# Patient Record
Sex: FEMALE | Race: WHITE | Employment: OTHER | URBAN - METROPOLITAN AREA
[De-identification: names, ages, dates, MRNs, and addresses within clinical notes are randomized per-mention and may not be internally consistent; named-entity substitution may affect disease eponyms.]

---

## 2020-09-30 ENCOUNTER — HOSPITAL ENCOUNTER (OUTPATIENT)
Dept: MAMMOGRAPHY | Age: 75
Discharge: HOME | End: 2020-09-30
Attending: INTERNAL MEDICINE
Payer: MEDICARE

## 2020-09-30 DIAGNOSIS — Z12.31 VISIT FOR SCREENING MAMMOGRAM: ICD-10-CM

## 2020-09-30 PROCEDURE — 77067 SCR MAMMO BI INCL CAD: CPT

## 2022-01-12 ENCOUNTER — TRANSCRIBE ORDER (OUTPATIENT)
Dept: SCHEDULING | Age: 77
End: 2022-01-12

## 2022-01-12 DIAGNOSIS — Z12.31 VISIT FOR SCREENING MAMMOGRAM: Primary | ICD-10-CM

## 2022-01-28 ENCOUNTER — TRANSCRIBE ORDER (OUTPATIENT)
Dept: REGISTRATION | Age: 77
End: 2022-01-28

## 2022-01-28 DIAGNOSIS — Z12.31 SCREENING MAMMOGRAM FOR HIGH-RISK PATIENT: Primary | ICD-10-CM

## 2022-02-26 ENCOUNTER — HOSPITAL ENCOUNTER (OUTPATIENT)
Dept: MAMMOGRAPHY | Age: 77
Discharge: HOME | End: 2022-02-26
Attending: INTERNAL MEDICINE
Payer: MEDICARE

## 2022-02-26 DIAGNOSIS — Z12.31 SCREENING MAMMOGRAM FOR HIGH-RISK PATIENT: ICD-10-CM

## 2022-02-26 PROCEDURE — 77063 BREAST TOMOSYNTHESIS BI: CPT

## 2022-03-03 ENCOUNTER — HOSPITAL ENCOUNTER (OUTPATIENT)
Dept: MAMMOGRAPHY | Age: 77
Discharge: HOME | End: 2022-03-03
Attending: INTERNAL MEDICINE
Payer: MEDICARE

## 2022-03-03 DIAGNOSIS — R92.8 ABNORMAL SCREENING MAMMOGRAM: ICD-10-CM

## 2022-03-03 PROCEDURE — 76642 ULTRASOUND BREAST LIMITED: CPT

## 2022-03-07 ENCOUNTER — HOSPITAL ENCOUNTER (OUTPATIENT)
Dept: MAMMOGRAPHY | Age: 77
Discharge: HOME | End: 2022-03-07
Attending: INTERNAL MEDICINE
Payer: MEDICARE

## 2022-03-07 VITALS — SYSTOLIC BLOOD PRESSURE: 125 MMHG | DIASTOLIC BLOOD PRESSURE: 63 MMHG | HEART RATE: 71 BPM

## 2022-03-07 DIAGNOSIS — Z12.31 VISIT FOR SCREENING MAMMOGRAM: ICD-10-CM

## 2022-03-07 DIAGNOSIS — R92.8 ABNORMAL MAMMOGRAM OF LEFT BREAST: ICD-10-CM

## 2022-03-07 PROCEDURE — 74011000250 HC RX REV CODE- 250: Performed by: INTERNAL MEDICINE

## 2022-03-07 PROCEDURE — 88305 TISSUE EXAM BY PATHOLOGIST: CPT

## 2022-03-07 PROCEDURE — 19083 BX BREAST 1ST LESION US IMAG: CPT

## 2022-03-07 PROCEDURE — 88361 TUMOR IMMUNOHISTOCHEM/COMPUT: CPT

## 2022-03-07 PROCEDURE — 77065 DX MAMMO INCL CAD UNI: CPT

## 2022-03-07 RX ORDER — LIDOCAINE HYDROCHLORIDE 10 MG/ML
10 INJECTION INFILTRATION; PERINEURAL
Status: COMPLETED | OUTPATIENT
Start: 2022-03-07 | End: 2022-03-07

## 2022-03-07 RX ORDER — LIDOCAINE HYDROCHLORIDE 10 MG/ML
4 INJECTION INFILTRATION; PERINEURAL
Status: DISPENSED | OUTPATIENT
Start: 2022-03-07 | End: 2022-03-07

## 2022-03-07 RX ADMIN — LIDOCAINE HYDROCHLORIDE 10 ML: 10 INJECTION, SOLUTION INFILTRATION; PERINEURAL at 09:25

## 2022-03-09 NOTE — PROGRESS NOTES
Dr Carla Johnson and I spoke with Ms Cipriano Calero and her sister regarding the results of her Lt breast biopsy. Pathology: IDC w/ lobular features. She had no post bx issues or concerns. She was a bit taken back with the results, she was not expecting a positive result. Her sister had DCIS 5 yrs ago and is a good support person.  She has the following upcoming appointments:  Dr Luis Daniel Cancino   3-11 @ 9:00  Dr Marcos Sandoval  TBD waiting on appt date & time  I gave them an information packet, prayed with them, answered their questions and told her I would call her with Dr Marcos Sandoval appt

## 2022-03-11 ENCOUNTER — PATIENT OUTREACH (OUTPATIENT)
Dept: CASE MANAGEMENT | Age: 77
End: 2022-03-11

## 2022-03-11 NOTE — PROGRESS NOTES
3/11/2022  Patient seen with Dr. Kaushik Sandoval for New Patient Breast Cancer visit. Here today with her sister Alejandra Wiseman, her primary support person. Patient is retired and lives alone. States from large family of 12 siblings. Independent in self care with no physical limitations. States has bad knees but does not require assistance to ambulate  Barriers:  No barriers identified. History:  Breast Cancer in sister age 67, DCIS, sister with Lymphoma 76  Type of cancer: Right breast infiltrating ductal carcinoma, ERPR+ HER2-  MRI none recommended.   Plan:  Surgery first then potentially chemo depending on Oncotype DX results, AI, and potentially radiation  Surgeon: Naima Amin 4/5/2022  PCP:  Mikael Salgado MD  Medical history:  Hyperlipidemia, Hypothyroidism, Jonathan Ugo, Glaucoma, [address]  Genetics Referral - not applicable, does not meet guidlines  Added MD and Navigator to treatment team   Written ASCO Breast information given  Social Determinants of health complete  Depression screen =0, well managed with Celexa

## 2022-03-14 ENCOUNTER — NURSE NAVIGATOR (OUTPATIENT)
Dept: CASE MANAGEMENT | Age: 77
End: 2022-03-14

## 2022-03-14 NOTE — PROGRESS NOTES
Call to patient, discussed per Genetics did not meet NCCN guidelines for genetic testing. Discussed with patient that she is always welcome to have consult with Genetics counselor and that self pay rate for testing is currently around $250.00. Leeanne Lopez Patient states she is not interested at this time in testing.

## 2022-03-23 PROBLEM — Z17.0 MALIGNANT NEOPLASM OF LEFT BREAST IN FEMALE, ESTROGEN RECEPTOR POSITIVE (HCC): Status: ACTIVE | Noted: 2022-03-23

## 2022-03-23 PROBLEM — C50.912 MALIGNANT NEOPLASM OF LEFT BREAST IN FEMALE, ESTROGEN RECEPTOR POSITIVE (HCC): Status: ACTIVE | Noted: 2022-03-23

## 2022-03-24 PROBLEM — Z17.0 MALIGNANT NEOPLASM OF LEFT BREAST IN FEMALE, ESTROGEN RECEPTOR POSITIVE (HCC): Status: ACTIVE | Noted: 2022-03-23

## 2022-03-24 PROBLEM — C50.912 MALIGNANT NEOPLASM OF LEFT BREAST IN FEMALE, ESTROGEN RECEPTOR POSITIVE (HCC): Status: ACTIVE | Noted: 2022-03-23

## 2022-04-06 RX ORDER — CEFAZOLIN SODIUM 1 G/3ML
2-3 INJECTION, POWDER, FOR SOLUTION INTRAMUSCULAR; INTRAVENOUS
Status: CANCELLED | OUTPATIENT
Start: 2022-04-06 | End: 2022-04-06

## 2022-04-21 VITALS — HEIGHT: 67 IN | WEIGHT: 225 LBS | BODY MASS INDEX: 35.31 KG/M2

## 2022-04-21 NOTE — PERIOP NOTES
Patient verified name and . Order for consent is found in EHR and matches case posting; patient verifies procedure. Type 1b surgery, Phone assessment complete. Orders were received. Labs per surgeon: none  Labs per anesthesia protocol: none    Patient answered medical/surgical history questions at their best of ability. All prior to admission medications documented in [facility]. Patient instructed to take the following medications the day of surgery according to anesthesia guidelines with a small sip of water: Celexa, synthroid. Hold all vitamins 7 days prior to surgery and NSAIDS 5 days prior to surgery. Prescription meds to hold:Celebrex and vitamins. Patient instructed on the following:    > Arrive at [facility], time of arrival to be called the day before by 1700  > NPO after midnight including gum, mints, and ice chips  > Responsible adult must drive patient to the hospital, stay during surgery, and patient will need supervision 24 hours after anesthesia  > Use hibiclens in shower the night before surgery and on the morning of surgery  > All piercings must be removed prior to arrival.    > Leave all valuables (money and jewelry) at home but bring insurance card and ID on DOS.   > Do not wear make-up, nail polish, lotions, cologne, perfumes, powders, or oil on skin. Artificial nails are not permitted.

## 2022-04-26 ENCOUNTER — HOSPITAL ENCOUNTER (OUTPATIENT)
Dept: SURGERY | Age: 77
Discharge: HOME | End: 2022-04-26

## 2022-05-02 ENCOUNTER — ANESTHESIA EVENT (OUTPATIENT)
Dept: SURGERY | Age: 77
End: 2022-05-02
Payer: MEDICARE

## 2022-05-03 ENCOUNTER — ANESTHESIA (OUTPATIENT)
Dept: SURGERY | Age: 77
End: 2022-05-03
Payer: MEDICARE

## 2022-05-03 ENCOUNTER — APPOINTMENT (OUTPATIENT)
Dept: MAMMOGRAPHY | Age: 77
End: 2022-05-03
Attending: SURGERY
Payer: MEDICARE

## 2022-05-03 ENCOUNTER — HOSPITAL ENCOUNTER (OUTPATIENT)
Age: 77
Setting detail: OUTPATIENT SURGERY
Discharge: HOME | End: 2022-05-03
Attending: SURGERY | Admitting: SURGERY
Payer: MEDICARE

## 2022-05-03 ENCOUNTER — APPOINTMENT (OUTPATIENT)
Dept: NUCLEAR MEDICINE | Age: 77
End: 2022-05-03
Attending: SURGERY
Payer: MEDICARE

## 2022-05-03 VITALS
TEMPERATURE: 97.8 F | HEART RATE: 89 BPM | BODY MASS INDEX: 34.37 KG/M2 | RESPIRATION RATE: 16 BRPM | SYSTOLIC BLOOD PRESSURE: 132 MMHG | DIASTOLIC BLOOD PRESSURE: 69 MMHG | HEIGHT: 67 IN | WEIGHT: 219 LBS | OXYGEN SATURATION: 95 %

## 2022-05-03 DIAGNOSIS — C50.912 DUCTAL CARCINOMA OF BREAST, LEFT (HCC): ICD-10-CM

## 2022-05-03 DIAGNOSIS — Z80.3 FAMILY HISTORY OF BREAST CANCER IN SISTER: ICD-10-CM

## 2022-05-03 PROCEDURE — 74011250636 HC RX REV CODE- 250/636: Performed by: SURGERY

## 2022-05-03 PROCEDURE — 76210000006 HC OR PH I REC 0.5 TO 1 HR: Performed by: SURGERY

## 2022-05-03 PROCEDURE — 74011000250 HC RX REV CODE- 250: Performed by: ANESTHESIOLOGY

## 2022-05-03 PROCEDURE — 38900 IO MAP OF SENT LYMPH NODE: CPT | Performed by: SURGERY

## 2022-05-03 PROCEDURE — 74011250636 HC RX REV CODE- 250/636: Performed by: ANESTHESIOLOGY

## 2022-05-03 PROCEDURE — 2709999900 HC NON-CHARGEABLE SUPPLY: Performed by: SURGERY

## 2022-05-03 PROCEDURE — 76210000020 HC REC RM PH II FIRST 0.5 HR: Performed by: SURGERY

## 2022-05-03 PROCEDURE — 77030031139 HC SUT VCRL2 J&J -A: Performed by: SURGERY

## 2022-05-03 PROCEDURE — 77030040361 HC SLV COMPR DVT MDII -B: Performed by: SURGERY

## 2022-05-03 PROCEDURE — 19301 PARTIAL MASTECTOMY: CPT | Performed by: SURGERY

## 2022-05-03 PROCEDURE — 74011000250 HC RX REV CODE- 250: Performed by: SURGERY

## 2022-05-03 PROCEDURE — 77065 DX MAMMO INCL CAD UNI: CPT

## 2022-05-03 PROCEDURE — 77030040922 HC BLNKT HYPOTHRM STRY -A: Performed by: ANESTHESIOLOGY

## 2022-05-03 PROCEDURE — 38525 BIOPSY/REMOVAL LYMPH NODES: CPT | Performed by: SURGERY

## 2022-05-03 PROCEDURE — 76010000162 HC OR TIME 1.5 TO 2 HR INTENSV-TIER 1: Performed by: SURGERY

## 2022-05-03 PROCEDURE — A9541 TC99M SULFUR COLLOID: HCPCS

## 2022-05-03 PROCEDURE — 76060000034 HC ANESTHESIA 1.5 TO 2 HR: Performed by: SURGERY

## 2022-05-03 PROCEDURE — C1819 TISSUE LOCALIZATION-EXCISION: HCPCS

## 2022-05-03 PROCEDURE — 77030031304 HC WAVWGD EIGR DISP INVO -D: Performed by: SURGERY

## 2022-05-03 PROCEDURE — 74011250637 HC RX REV CODE- 250/637: Performed by: ANESTHESIOLOGY

## 2022-05-03 PROCEDURE — 88307 TISSUE EXAM BY PATHOLOGIST: CPT

## 2022-05-03 PROCEDURE — 77030002996 HC SUT SLK J&J -A: Performed by: SURGERY

## 2022-05-03 PROCEDURE — 77030010512 HC APPL CLP LIG J&J -C: Performed by: SURGERY

## 2022-05-03 PROCEDURE — 77030016441 HC APPL CLP LIG1 J&J -B: Performed by: SURGERY

## 2022-05-03 PROCEDURE — 77030037088 HC TUBE ENDOTRACH ORAL NSL COVD-A: Performed by: ANESTHESIOLOGY

## 2022-05-03 PROCEDURE — 77030039425 HC BLD LARYNG TRULITE DISP TELE -A: Performed by: ANESTHESIOLOGY

## 2022-05-03 RX ORDER — SODIUM CHLORIDE, SODIUM LACTATE, POTASSIUM CHLORIDE, CALCIUM CHLORIDE 600; 310; 30; 20 MG/100ML; MG/100ML; MG/100ML; MG/100ML
100 INJECTION, SOLUTION INTRAVENOUS CONTINUOUS
Status: DISCONTINUED | OUTPATIENT
Start: 2022-05-03 | End: 2022-05-03 | Stop reason: HOSPADM

## 2022-05-03 RX ORDER — FENTANYL CITRATE 50 UG/ML
INJECTION, SOLUTION INTRAMUSCULAR; INTRAVENOUS AS NEEDED
Status: DISCONTINUED | OUTPATIENT
Start: 2022-05-03 | End: 2022-05-03 | Stop reason: HOSPADM

## 2022-05-03 RX ORDER — OXYCODONE HYDROCHLORIDE 5 MG/1
5 TABLET ORAL
Status: COMPLETED | OUTPATIENT
Start: 2022-05-03 | End: 2022-05-03

## 2022-05-03 RX ORDER — DEXAMETHASONE SODIUM PHOSPHATE 4 MG/ML
INJECTION, SOLUTION INTRA-ARTICULAR; INTRALESIONAL; INTRAMUSCULAR; INTRAVENOUS; SOFT TISSUE AS NEEDED
Status: DISCONTINUED | OUTPATIENT
Start: 2022-05-03 | End: 2022-05-03 | Stop reason: HOSPADM

## 2022-05-03 RX ORDER — CEFAZOLIN SODIUM/WATER 2 G/20 ML
2 SYRINGE (ML) INTRAVENOUS ONCE
Status: COMPLETED | OUTPATIENT
Start: 2022-05-03 | End: 2022-05-03

## 2022-05-03 RX ORDER — ROCURONIUM BROMIDE 10 MG/ML
INJECTION, SOLUTION INTRAVENOUS AS NEEDED
Status: DISCONTINUED | OUTPATIENT
Start: 2022-05-03 | End: 2022-05-03 | Stop reason: HOSPADM

## 2022-05-03 RX ORDER — ONDANSETRON 2 MG/ML
INJECTION INTRAMUSCULAR; INTRAVENOUS AS NEEDED
Status: DISCONTINUED | OUTPATIENT
Start: 2022-05-03 | End: 2022-05-03 | Stop reason: HOSPADM

## 2022-05-03 RX ORDER — TRAMADOL HYDROCHLORIDE 50 MG/1
50 TABLET ORAL
Qty: 20 TABLET | Refills: 0 | Status: SHIPPED | OUTPATIENT
Start: 2022-05-03 | End: 2022-05-08

## 2022-05-03 RX ORDER — MIDAZOLAM HYDROCHLORIDE 1 MG/ML
2 INJECTION, SOLUTION INTRAMUSCULAR; INTRAVENOUS
Status: DISCONTINUED | OUTPATIENT
Start: 2022-05-03 | End: 2022-05-03 | Stop reason: HOSPADM

## 2022-05-03 RX ORDER — HALOPERIDOL 5 MG/ML
1 INJECTION INTRAMUSCULAR
Status: DISCONTINUED | OUTPATIENT
Start: 2022-05-03 | End: 2022-05-03 | Stop reason: HOSPADM

## 2022-05-03 RX ORDER — HYDROMORPHONE HYDROCHLORIDE 2 MG/ML
0.5 INJECTION, SOLUTION INTRAMUSCULAR; INTRAVENOUS; SUBCUTANEOUS
Status: DISCONTINUED | OUTPATIENT
Start: 2022-05-03 | End: 2022-05-03 | Stop reason: HOSPADM

## 2022-05-03 RX ORDER — PROPOFOL 10 MG/ML
INJECTION, EMULSION INTRAVENOUS AS NEEDED
Status: DISCONTINUED | OUTPATIENT
Start: 2022-05-03 | End: 2022-05-03 | Stop reason: HOSPADM

## 2022-05-03 RX ORDER — SODIUM CHLORIDE, SODIUM LACTATE, POTASSIUM CHLORIDE, CALCIUM CHLORIDE 600; 310; 30; 20 MG/100ML; MG/100ML; MG/100ML; MG/100ML
75 INJECTION, SOLUTION INTRAVENOUS CONTINUOUS
Status: DISCONTINUED | OUTPATIENT
Start: 2022-05-03 | End: 2022-05-03 | Stop reason: HOSPADM

## 2022-05-03 RX ORDER — GLYCOPYRROLATE 0.2 MG/ML
INJECTION INTRAMUSCULAR; INTRAVENOUS AS NEEDED
Status: DISCONTINUED | OUTPATIENT
Start: 2022-05-03 | End: 2022-05-03 | Stop reason: HOSPADM

## 2022-05-03 RX ORDER — LIDOCAINE HYDROCHLORIDE 10 MG/ML
5 INJECTION INFILTRATION; PERINEURAL
Status: COMPLETED | OUTPATIENT
Start: 2022-05-03 | End: 2022-05-03

## 2022-05-03 RX ORDER — DIPHENHYDRAMINE HYDROCHLORIDE 50 MG/ML
12.5 INJECTION, SOLUTION INTRAMUSCULAR; INTRAVENOUS
Status: DISCONTINUED | OUTPATIENT
Start: 2022-05-03 | End: 2022-05-03 | Stop reason: HOSPADM

## 2022-05-03 RX ORDER — EPHEDRINE SULFATE/0.9% NACL/PF 50 MG/5 ML
SYRINGE (ML) INTRAVENOUS AS NEEDED
Status: DISCONTINUED | OUTPATIENT
Start: 2022-05-03 | End: 2022-05-03 | Stop reason: HOSPADM

## 2022-05-03 RX ORDER — BUPIVACAINE HYDROCHLORIDE AND EPINEPHRINE 5; 5 MG/ML; UG/ML
INJECTION, SOLUTION EPIDURAL; INTRACAUDAL; PERINEURAL AS NEEDED
Status: DISCONTINUED | OUTPATIENT
Start: 2022-05-03 | End: 2022-05-03 | Stop reason: HOSPADM

## 2022-05-03 RX ORDER — LIDOCAINE HYDROCHLORIDE 10 MG/ML
0.1 INJECTION INFILTRATION; PERINEURAL AS NEEDED
Status: DISCONTINUED | OUTPATIENT
Start: 2022-05-03 | End: 2022-05-03 | Stop reason: HOSPADM

## 2022-05-03 RX ORDER — NALOXONE HYDROCHLORIDE 0.4 MG/ML
0.1 INJECTION, SOLUTION INTRAMUSCULAR; INTRAVENOUS; SUBCUTANEOUS
Status: DISCONTINUED | OUTPATIENT
Start: 2022-05-03 | End: 2022-05-03 | Stop reason: HOSPADM

## 2022-05-03 RX ORDER — NEOSTIGMINE METHYLSULFATE 1 MG/ML
INJECTION, SOLUTION INTRAVENOUS AS NEEDED
Status: DISCONTINUED | OUTPATIENT
Start: 2022-05-03 | End: 2022-05-03 | Stop reason: HOSPADM

## 2022-05-03 RX ORDER — LIDOCAINE HYDROCHLORIDE 20 MG/ML
INJECTION, SOLUTION EPIDURAL; INFILTRATION; INTRACAUDAL; PERINEURAL AS NEEDED
Status: DISCONTINUED | OUTPATIENT
Start: 2022-05-03 | End: 2022-05-03 | Stop reason: HOSPADM

## 2022-05-03 RX ORDER — FLUMAZENIL 0.1 MG/ML
0.2 INJECTION INTRAVENOUS
Status: DISCONTINUED | OUTPATIENT
Start: 2022-05-03 | End: 2022-05-03 | Stop reason: HOSPADM

## 2022-05-03 RX ADMIN — Medication 2 G: at 13:07

## 2022-05-03 RX ADMIN — SODIUM CHLORIDE, SODIUM LACTATE, POTASSIUM CHLORIDE, AND CALCIUM CHLORIDE 100 ML/HR: 600; 310; 30; 20 INJECTION, SOLUTION INTRAVENOUS at 09:08

## 2022-05-03 RX ADMIN — ROCURONIUM BROMIDE 35 MG: 50 INJECTION, SOLUTION INTRAVENOUS at 13:13

## 2022-05-03 RX ADMIN — Medication 2 MG: at 14:36

## 2022-05-03 RX ADMIN — Medication 10 MG: at 13:39

## 2022-05-03 RX ADMIN — DEXAMETHASONE SODIUM PHOSPHATE 8 MG: 4 INJECTION, SOLUTION INTRAMUSCULAR; INTRAVENOUS at 13:43

## 2022-05-03 RX ADMIN — FENTANYL CITRATE 100 MCG: 50 INJECTION INTRAMUSCULAR; INTRAVENOUS at 14:19

## 2022-05-03 RX ADMIN — FENTANYL CITRATE 50 MCG: 50 INJECTION INTRAMUSCULAR; INTRAVENOUS at 13:13

## 2022-05-03 RX ADMIN — Medication 5 MG: at 13:19

## 2022-05-03 RX ADMIN — LIDOCAINE HYDROCHLORIDE 100 MG: 20 INJECTION, SOLUTION EPIDURAL; INFILTRATION; INTRACAUDAL; PERINEURAL at 13:13

## 2022-05-03 RX ADMIN — LIDOCAINE HYDROCHLORIDE 5 ML: 10 INJECTION, SOLUTION INFILTRATION; PERINEURAL at 11:22

## 2022-05-03 RX ADMIN — ONDANSETRON 4 MG: 2 INJECTION INTRAMUSCULAR; INTRAVENOUS at 13:43

## 2022-05-03 RX ADMIN — Medication 5 MG: at 13:18

## 2022-05-03 RX ADMIN — HYDROMORPHONE HYDROCHLORIDE 0.5 MG: 2 INJECTION, SOLUTION INTRAMUSCULAR; INTRAVENOUS; SUBCUTANEOUS at 15:00

## 2022-05-03 RX ADMIN — OXYCODONE 5 MG: 5 TABLET ORAL at 15:19

## 2022-05-03 RX ADMIN — GLYCOPYRROLATE 0.4 MG: 0.2 INJECTION, SOLUTION INTRAMUSCULAR; INTRAVENOUS at 14:36

## 2022-05-03 RX ADMIN — FENTANYL CITRATE 50 MCG: 50 INJECTION INTRAMUSCULAR; INTRAVENOUS at 13:44

## 2022-05-03 RX ADMIN — LIDOCAINE HYDROCHLORIDE 0.1 ML: 10 INJECTION, SOLUTION INFILTRATION; PERINEURAL at 09:07

## 2022-05-03 RX ADMIN — PROPOFOL 150 MG: 10 INJECTION, EMULSION INTRAVENOUS at 13:13

## 2022-05-03 NOTE — ANESTHESIA PREPROCEDURE EVALUATION
Relevant Problems   PERSONAL HX & FAMILY HX OF CANCER   (+) Malignant neoplasm of left breast in female, estrogen receptor positive (HCC)       Anesthetic History   No history of anesthetic complications            Review of Systems / Medical History  Patient summary reviewed and pertinent labs reviewed    Pulmonary  Within defined limits                 Neuro/Psych   Within defined limits           Cardiovascular              Hyperlipidemia    Exercise tolerance: >4 METS     GI/Hepatic/Renal  Within defined limits              Endo/Other      Hypothyroidism: well controlled  Obesity, arthritis and cancer (Breast)     Other Findings            Physical Exam    Airway  Mallampati: II  TM Distance: 4 - 6 cm  Neck ROM: normal range of motion   Mouth opening: Normal     Cardiovascular  Regular rate and rhythm,  S1 and S2 normal,  no murmur, click, rub, or gallop             Dental    Dentition: Lower partial plate     Pulmonary  Breath sounds clear to auscultation               Abdominal  GI exam deferred       Other Findings            Anesthetic Plan    ASA: 2  Anesthesia type: general          Induction: Intravenous  Anesthetic plan and risks discussed with: Patient

## 2022-05-03 NOTE — ANESTHESIA POSTPROCEDURE EVALUATION
Procedure(s):  LEFT PARTIAL MASTECTOMY WITH WIRE  LOC  LEFT SENTINEL NODE BIOPSY WITH LYMPHATIC MAPPING. general    Anesthesia Post Evaluation      Multimodal analgesia: multimodal analgesia not used between 6 hours prior to anesthesia start to PACU discharge  Patient location during evaluation: PACU  Patient participation: complete - patient participated  Level of consciousness: awake and alert  Pain management: adequate  Airway patency: patent  Anesthetic complications: no  Cardiovascular status: hemodynamically stable  Respiratory status: acceptable  Hydration status: acceptable        INITIAL Post-op Vital signs:   Vitals Value Taken Time   /71 05/03/22 1525   Temp 36.6 °C (97.8 °F) 05/03/22 1454   Pulse 89 05/03/22 1528   Resp 16 05/03/22 1525   SpO2 97 % 05/03/22 1528   Vitals shown include unvalidated device data.

## 2022-05-03 NOTE — OP NOTES
Operative Note    Date of Surgery: 5/3/2022    Preoperative Diagnosis: Malignant neoplasm of left female breast, unspecified estrogen receptor status, unspecified site of breast ([address] [C50.912]  Estrogen receptor positive [Z17.0]     Postoperative Diagnosis: Malignant neoplasm of left female breast, unspecified estrogen receptor status, unspecified site of breast (HCC) [C50.912]  Estrogen receptor positive [Z17.0]     Surgeon(s) and Role:     * Sasha Johnson MD - Primary      Anesthesia: General    Procedure:   Procedure(s):  LEFT PARTIAL MASTECTOMY WITH WIRE  LOC  LEFT SENTINEL NODE BIOPSY WITH LYMPHATIC MAPPING    Indications:  As detailed in the H&P. Procedure in Detail:  The patient was taken to the operating room, identified as Hilayr Meyers, and the procedure verified. A Time Out was held and the above information confirmed. Prior to the operative procedure,  Hilary Meyers underwent a Left breast technetium nuclear medicine scan. The technetium lymphoscintigraphy showed uptake in 1 axillary lymph nodes. Wire localization of the prior biopsy site was done in the breast center. The patient was then brought to the operating room and placed on the table in a supine position with adequate padding to all pressure points and the arm extended laterally. After induction of anesthesia,  the chest and arm were then prepped and draped in the usual sterile manner. The Neoprobe was used to sweep the  axilla to confirm activity. Attention was then turned to the left breast.  A skin-crease UOQ incision was made in the area of the wire. This was carried down with small superior and inferior flaps of subcutaneous tissues. The wire was exposed to just superficial to the area of interest, where the area was excised circumferentially, marked in the usual manner, and imaged immediately for confirmation of clip capture and estimation of margins.      A curvilinear incision was then made in the low axilla and was carried down through the subcutaneous tissues with cautery. Careful sharp and blunt dissection was then used following visual and gamma probe cues to identify 3 axillary  nodes which were dissected from the surrounding tissues using clips to control lymphatic and vascular structures to the node, and each node was sent separately to pathology; node(s) had max ex-vivo count of 1748, 8783, 1205cps. The probe was then used to sweep the axilla and no further activity >10% of lowest node count was noted. Hemostasis was achieved in both sites. Both areas were infiltrated with local and closed with interrupted 3-0 Vicryl and 4-0 Vicryl subcuticular sutures. Steri-Strips were applied to the wound. Steristrips, telfa and tegaderm was placed over the axillary incision. A gentle pressure dressing was applied to the breast. Sponge and needle counts were correct times two. The patient tolerated the procedure well. The patient was transferred to recovery room in stable condition.                      Estimated Blood Loss: <30cc    Specimens:   ID Type Source Tests Collected by Time Destination   1 : LEFT PARTIAL MASTECTOMY Fresh Breast  Lorraine Gardner MD 5/3/2022 1338 Pathology   2 : LEFT SENTINEL NODE #1- 5270 Fresh Breast  Lorraine Gardner MD 5/3/2022 1403 Pathology   3 : LEFT SENTINEL NODE #2- [address]896 Fresh Axilla  Lorraine Gardner MD 5/3/2022 1414 Pathology   4 : LEFT SENTINEL NODE #3- [address] Fresh Breast  Lorraine Gardner MD 5/3/2022 1419 Pathology        Signed By: Mehnaz Muller MD     May 3, 2022

## 2022-05-03 NOTE — H&P
Primary/Requesting provider: Benigno Ryder MD          Chief Complaint   Patient presents with    New Patient       keft breast IDC          HISTORY OF PRESENT ILLNESS  Nessa Arizmendi is a 77 y.o. female. HPI  Patient is a 77 y.o. female who presents for discussion of her recent LEFT breast cancer diagnosis. She reports no triggering symptoms, with abnormality noted on routine mammogram.  She has not noted left breast pain, swelling, redness, nipple discharge or retraction. She has no personal history of breast problems. Her sister, Maxim, was diagnosed at age 67. She has seen oncology Mj Novoa with surgery recommended.     Medications:        Current Outpatient Medications   Medication Sig    cholecalciferol, vitamin D3, (Cholecalciferol, VitD3,, Bulk,) 100,000 unit/gram powd 1 Tablet    co-enzyme Q-10 (Co Q-10) 100 mg capsule 1 capsule with a meal    levothyroxine (SYNTHROID) 50 mcg tablet      rosuvastatin (CRESTOR) 40 mg tablet      colestipoL (COLESTID) 1 gram tablet 1 Tablet    cyanocobalamin 1,000 mcg tablet Take 1,000 mcg by mouth daily.  celecoxib (CELEBREX) 200 mg capsule TAKE 1 CAPSULE TWICE DAILY WITH FOOD    Lumigan 0.01 % ophthalmic drops      citalopram (CELEXA) 40 mg tablet 1 Tablet daily.      No current facility-administered medications for this visit.         Allergies:         Allergies   Allergen Reactions    Hydrocodone-Acetaminophen Itching    Levofloxacin Nausea and Vomiting         Past History:       Past Medical History:   Diagnosis Date    Anxiety      Arthritis       Bilateral Knees    Glaucoma      Hypercholesterolemia      Thyroid disease              Past Surgical History:   Procedure Laterality Date    HX BACK SURGERY         L5    HX COLONOSCOPY   2021     [facility]    HX KNEE ARTHROSCOPY Bilateral      IR CHOLECYSTOSTOMY PERCUTANEOUS             Family and Social History:        Family History   Problem Relation Age of Onset    Breast Cancer Sister 67    Left Breast Cancer Sister      Heart block Sister      Diabetes Mother      Heart Disease Father      Heart Disease Paternal Grandmother      Heart Disease Paternal Grandfather        Social History            Socioeconomic History    Marital status: SINGLE       Spouse name: Not on file    Number of children: Not on file    Years of education: Not on file    Highest education level: Not on file   Occupational History    Not on file   Tobacco Use    Smoking status: Never Smoker    Smokeless tobacco: Never Used   Vaping Use    Vaping Use: Never used   Substance and Sexual Activity    Alcohol use: Yes       Alcohol/week: 6.0 standard drinks       Types: 6 Glasses of wine per week       Comment: occassional    Drug use: Not Currently    Sexual activity: Not on file   Other Topics Concern    Not on file   Social History Narrative    Not on file      Social Determinants of Health          Financial Resource Strain: Low Risk     Difficulty of Paying Living Expenses: Not very hard   Food Insecurity: No Food Insecurity    Worried About Running Out of Food in the Last Year: Never true    Power of Food in the Last Year: Never true   Transportation Needs: No Transportation Needs    Lack of Transportation (Medical): No    Lack of Transportation (Non-Medical): No   Physical Activity: Inactive    Days of Exercise per Week: 0 days    Minutes of Exercise per Session: 0 min   Stress: No Stress Concern Present    Feeling of Stress :  Only a little   Social Connections: Unknown    Frequency of Communication with Friends and Family: More than three times a week    Frequency of Social Gatherings with Friends and Family: Twice a week    Attends Scientologist Services: Never    Active Member of Clubs or Organizations: No    Attends Club or Organization Meetings: Not on file    Marital Status: Not on file   Intimate Partner Violence:     Fear of Current or Ex-Partner: Not on file    Emotionally Abused: Not on file    Physically Abused: Not on file    Sexually Abused: Not on file   Housing Stability: Low Risk     Unable to Pay for Housing in the Last Year: No    Number of Places Lived in the Last Year: 1    Unstable Housing in the Last Year: No       Review of Systems   Constitutional: Negative. HENT: Negative. Eyes: Negative. Respiratory: Negative. Cardiovascular: Negative. Gastrointestinal: Negative. Genitourinary: Negative. Musculoskeletal: Negative. Skin: Negative. Neurological: Negative. Endo/Heme/Allergies: Negative. Psychiatric/Behavioral: Negative.          Physical Exam  Visit Vitals  /73   Pulse 76   Ht 5' 7\" (1.702 m)   Wt 218 lb (98.9 kg)   BMI 34.14 kg/m²      General Appearance: In no acute distress   Ears/Nose/Mouth/Throat:   Hearing grossly normal.          Neck: Supple. Chest:   Lungs clear to auscultation bilaterally. Cardiovascular:  Regular rate and rhythm, S1, S2 normal, no murmur. Abdomen:   Soft. Extremities: No gross deformity    Neuro: alert and oriented x 3                JEFFREY Results (most recent):  Results from Hospital Encounter encounter on 03/07/22     JEFFREY POST BX IMAGING LT INCL CAD     Narrative  ADDENDUM, 3/10/2022:     Pathology was noted as A: Left breast, 1:00 position, 9 cm from nipple, core  biopsy: Infiltrating ductal carcinoma with lobular features, low grade (well  differentiated). Definite in situ component and lymphovascular invasion are not  identified.     Results concordant with imaging. Pathology report was called to  Dr. Laurie Urena office on 3/8/2022 by RT Charlie(KANG)(M).   Patient was referred to Dr. Adele Petersen and has an appointment scheduled with her on 3/11/2022.              LEFT BREAST CORE NEEDLE BIOPSY AND MARKER CLIP PLACEMENT WITH ULTRASOUND  GUIDANCE,  LEFT DIAGNOSTIC DIGITAL MAMMOGRAPHY:     CLINICAL HISTORY:  Abnormal mammogram and ultrasound for histologic diagnosis in  a 71-year-old with a family history of breast cancer.     BIOPSY AND CLIP PLACEMENT:  Written informed consent was obtained. Preliminary  ultrasound evaluation of the left axilla demonstrated no pathologically enlarged  or dysmorphic nodes. Using standard aseptic technique and 1% Xylocaine local  anesthesia, a 14-gauge Achieve core biopsy needle was used to obtain a total of  5 samples of the 2.1 cm irregular, shadowing hypoechoic mass at the 1:00  position 9 cm from the nipple confirmed at mammography and ultrasound on . The  samples were placed in formalin for histologic evaluation, and a titanium marker  clip was placed through the cannula.     POST-BIOPSY LEFT MAMMOGRAM:  Craniocaudal and straight lateral views demonstrate  the biopsy marker clip in expected position. The patient tolerated the  procedure well without immediate complication and left the department in good  condition.     Impression  1. Successful core needle biopsy and marker clip placement for the 2.1 cm  irregular mass at the posterior 1:00 position.     2. If histology fails to confirm malignancy, then followup surgical  consultation would be indicated for wire localization and excisional biopsy of  this mass with very worrisome imaging characteristics.        Thank you for referral of this patient. ASSESSMENT and PLAN       Encounter Diagnoses   Name Primary?  Ductal carcinoma of breast, left ([facility] [address] Yes      Discussed pathology in detail with pt and sister Maxim. ER status also reviewed and we discussed how this may modify her post-surgical management including treatment with anti-estrogen agents and the need to stop any hormonal supplementation she may be taking. Discussed family history as it may relate to any value to investigation of a genetic basis for her cancer. -NA     Discussed management options.  Initial treatment should be surgical. Reviewed total vs. Partial mastectomy (w/ XRT) including different local recurrence rates but equivalent long term survival rates. Discussed potential indications for post-mastectomy XRT as well- given excellent functional status, XRT may be considered.      After discussion, we will proceed with wire localized, seed localized, left partial mastectomy with sentinel lymph node biopsy  . We have discussed the technical details of the procedure(s) in detail. Risks reviewed include anesthetic risks, bleeding, infection, wound healing problems and cosmetic abnormalities, need for further surgical intervention depending on pathology reports, lymphedema if axillary procedure planned, and recurrence. All questions are answered. BPH (benign prostatic hyperplasia)

## 2022-05-03 NOTE — DISCHARGE INSTRUCTIONS
Kristie Amador M.D.  [phone]    Instructions following Breast Surgery (lumpectomy, partial mastectomy)    ACTIVITY:   Try to take a few short walks with help around the house later today. It is very important to take short walks to avoid blood clots and pneumonia.  You may be light-headed or sleepy from anesthesia, so be careful going up and down stairs.  Avoid any activity that involves lifting your operative-side arm above your head until your follow-up appointment. We suggest wearing a tight-fitting bra (with or without a wire) continuously for the next 48 hours to minimize motion of the breast.    DIET:   Start with clear, non-carbonated liquids when you get home (sugar-free if you are diabetic), such as Gatorade, chicken broth, etc., and you may advance to regular foods as you feel able. PAIN:  **YOU WERE GIVEN 5 MG OXYCODONE FOR PAIN AT 3:19 PM.  YOU MAY TAKE PAIN MEDICATION AGAIN AFTER 7:19 PM.  Tavo Hunter You will be given a prescription for pain medication.  Try to take the pain medication with food, even a few crackers.  You may also use Tylenol, Motrin, Advil, or Aleve instead of the prescription pain medication. Do no take Tylenol and the prescription pain medication within 3 hours of each other.  URINARY RETENTION: If you are unable to empty your bladder within 6-8 hours after returning home, please go to your nearest Emergency Department or Urgent Care for urinary catheterization. WOUND CARE:   Please keep the dressing dry and intact for 3 days after surgery. You may then remove the tape and gauze;  at this time it is fine to get the incision wet,  The steri-strips will probably remain on your skin for several more days.  You may bathe prior to removing the dressing as long as the dressing remains dry   It is not uncommon for the breast to have a bruised appearance in the region of the surgery; this will clear over several days.    Incisions will sometimes develop redness around them as well as a hard lumpy feel. If this area of redness continues to get larger, please call the office. FOLLOW UP:   Your follow-up appointment is usually made when your surgery is arranged. Please call the office if you are not sure of this appointment. CALL THE DOCTOR IF:   You have a temperature higher than 101.5° Fahrenheit for more than 6 hours.  You have severe nausea or vomiting.  You develop increasing redness or signs of infection at the incision. Continue home medications as previously prescribed. MEDICATION INTERACTION:  During your procedure you potentially received a medication or medications which may reduce the effectiveness of oral contraceptives. Please consider other forms of contraception for 1 month following your procedure if you are currently using oral contraceptives as your primary form of birth control. In addition to this, we recommend continuing your oral contraceptive as prescribed, unless otherwise instructed by your physician, during this time    After general anesthesia or intravenous sedation, for 24 hours or while taking prescription Narcotics:  · Limit your activities  · A responsible adult needs to be with you for the next 24 hours  · Do not drive and operate hazardous machinery  · Do not make important personal or business decisions  · Do not drink alcoholic beverages  · If you have not urinated within 8 hours after discharge, please contact your surgeon on call. · If you have sleep apnea and have a CPAP machine, please use it for all naps and sleeping. · Please use caution when taking narcotics and any of your home medications that may cause drowsiness. *  Please give a list of your current medications to your Primary Care Provider. *  Please update this list whenever your medications are discontinued, doses are      changed, or new medications (including over-the-counter products) are added.   *  Please carry medication information at all times in case of emergency situations. These are general instructions for a healthy lifestyle:  No smoking/ No tobacco products/ Avoid exposure to second hand smoke  Surgeon General's Warning:  Quitting smoking now greatly reduces serious risk to your health. Obesity, smoking, and sedentary lifestyle greatly increases your risk for illness  A healthy diet, regular physical exercise & weight monitoring are important for maintaining a healthy lifestyle    You may be retaining fluid if you have a history of heart failure or if you experience any of the following symptoms:  Weight gain of 3 pounds or more overnight or 5 pounds in a week, increased swelling in our hands or feet or shortness of breath while lying flat in bed. Please call your doctor as soon as you notice any of these symptoms; do not wait until your next office visit.

## 2022-05-04 NOTE — PROGRESS NOTES
Late Entry      Spiritual Care visit. Initial Visit, Pre Surgery Consult. Visit and prayer before patient goes to surgery.     Visit by Bubba Fuentes M.Ed., Th.B. ,Staff

## 2022-05-09 ENCOUNTER — NURSE NAVIGATOR (OUTPATIENT)
Dept: CASE MANAGEMENT | Age: 77
End: 2022-05-09

## 2022-05-12 ENCOUNTER — NURSE NAVIGATOR (OUTPATIENT)
Dept: CASE MANAGEMENT | Age: 77
End: 2022-05-12

## 2022-05-22 NOTE — H&P (VIEW-ONLY)
Progress Notes by Chuck Mccord MD at 05/10/22 0920                Author: Chuck Mccord MD  Service: --  Author Type: Physician       Filed: 05/11/22 1333  Encounter Date: 5/10/2022  Status: Signed          : Chuck Mccord MD (Physician)                       Bola Jon MD, [facility], [address] [address]   Phone [phone]   Fax [phone]         Date of visit: 5/11/2022       Primary/Requesting provider: Gavin Selby MD      No chief complaint on file. Liza Gabriel   presents for follow-up after partial mastectomy and sentinel node biopsy, left-sided. She reports mild soreness, but no significant problems. She is returning to normal activities. EXAM:   She  is in no distress   Left breast without hematoma/seroma, mild ecchymosis. Left axilla without lymphocele/hematoma   Incision: both sites healing well              ASSESSMENT/PLAN:          1. Ductal carcinoma of breast, left ([address]         2. Family history of breast cancer in sister              Reviewed overall good pathology news- she is T1cN0 cM0   Need for margin re-excision discussed in great detail. This will not change her stage. Given her breast tissue volume, additional excision will still not likely have significant cosmetic impact. After discussion, we will proceed with  Margin re-excision x 3. We have discussed the technical details of the procedure(s) in detail. Risks reviewed include anesthetic risks, bleeding,  infection, wound healing problems and cosmetic abnormalities, need for further surgical intervention depending on pathology reports, lymphedema if axillary procedure planned, and recurrence. All questions are answered.

## 2022-05-25 ENCOUNTER — OFFICE VISIT (OUTPATIENT)
Dept: ONCOLOGY | Age: 77
End: 2022-05-25
Payer: COMMERCIAL

## 2022-05-25 ENCOUNTER — HOSPITAL ENCOUNTER (OUTPATIENT)
Dept: MRI IMAGING | Age: 77
Discharge: HOME | End: 2022-05-28
Payer: MEDICARE

## 2022-05-25 VITALS
TEMPERATURE: 97.9 F | RESPIRATION RATE: 18 BRPM | HEIGHT: 67 IN | BODY MASS INDEX: 34.37 KG/M2 | OXYGEN SATURATION: 97 % | WEIGHT: 219 LBS | SYSTOLIC BLOOD PRESSURE: 137 MMHG | DIASTOLIC BLOOD PRESSURE: 80 MMHG | HEART RATE: 89 BPM

## 2022-05-25 DIAGNOSIS — C50.912 MALIGNANT NEOPLASM OF LEFT BREAST IN FEMALE, ESTROGEN RECEPTOR POSITIVE, UNSPECIFIED SITE OF BREAST (HCC): Primary | ICD-10-CM

## 2022-05-25 DIAGNOSIS — Z17.0 MALIGNANT NEOPLASM OF LEFT BREAST IN FEMALE, ESTROGEN RECEPTOR POSITIVE, UNSPECIFIED SITE OF BREAST (HCC): Primary | ICD-10-CM

## 2022-05-25 DIAGNOSIS — Z80.3 FAMILY HISTORY OF BREAST CANCER IN SISTER: ICD-10-CM

## 2022-05-25 DIAGNOSIS — C50.912 DUCTAL CARCINOMA OF BREAST, LEFT (HCC): ICD-10-CM

## 2022-05-25 PROCEDURE — 2580000003 HC RX 258: Performed by: SURGERY

## 2022-05-25 PROCEDURE — A9579 GAD-BASE MR CONTRAST NOS,1ML: HCPCS | Performed by: SURGERY

## 2022-05-25 PROCEDURE — 6360000004 HC RX CONTRAST MEDICATION: Performed by: SURGERY

## 2022-05-25 PROCEDURE — C8908 MRI W/O FOL W/CONT, BREAST,: HCPCS

## 2022-05-25 PROCEDURE — 77049 MRI BREAST C-+ W/CAD BI: CPT

## 2022-05-25 PROCEDURE — 99214 OFFICE O/P EST MOD 30 MIN: CPT | Performed by: INTERNAL MEDICINE

## 2022-05-25 PROCEDURE — 1123F ACP DISCUSS/DSCN MKR DOCD: CPT | Performed by: INTERNAL MEDICINE

## 2022-05-25 RX ORDER — 0.9 % SODIUM CHLORIDE 0.9 %
100 INTRAVENOUS SOLUTION INTRAVENOUS ONCE
Status: COMPLETED | OUTPATIENT
Start: 2022-05-25 | End: 2022-05-25

## 2022-05-25 RX ORDER — SODIUM CHLORIDE 0.9 % (FLUSH) 0.9 %
10 SYRINGE (ML) INJECTION AS NEEDED
Status: DISCONTINUED | OUTPATIENT
Start: 2022-05-25 | End: 2022-05-29 | Stop reason: HOSPADM

## 2022-05-25 RX ADMIN — GADOTERIDOL 20 ML: 279.3 INJECTION, SOLUTION INTRAVENOUS at 15:14

## 2022-05-25 RX ADMIN — SODIUM CHLORIDE, PRESERVATIVE FREE 10 ML: 5 INJECTION INTRAVENOUS at 15:14

## 2022-05-25 RX ADMIN — SODIUM CHLORIDE 100 ML: 900 INJECTION, SOLUTION INTRAVENOUS at 15:14

## 2022-05-25 ASSESSMENT — PATIENT HEALTH QUESTIONNAIRE - PHQ9
2. FEELING DOWN, DEPRESSED OR HOPELESS: 0
SUM OF ALL RESPONSES TO PHQ QUESTIONS 1-9: 0
SUM OF ALL RESPONSES TO PHQ QUESTIONS 1-9: 0
1. LITTLE INTEREST OR PLEASURE IN DOING THINGS: 0
SUM OF ALL RESPONSES TO PHQ QUESTIONS 1-9: 0
SUM OF ALL RESPONSES TO PHQ9 QUESTIONS 1 & 2: 0
SUM OF ALL RESPONSES TO PHQ QUESTIONS 1-9: 0

## 2022-05-25 ASSESSMENT — ENCOUNTER SYMPTOMS
CONSTIPATION: 0
TROUBLE SWALLOWING: 0
HEMOPTYSIS: 0
CHEST TIGHTNESS: 0
ABDOMINAL DISTENTION: 0
WHEEZING: 0
BLOOD IN STOOL: 0
VOICE CHANGE: 0
SORE THROAT: 0
NAUSEA: 0
SHORTNESS OF BREATH: 0
ABDOMINAL PAIN: 0
DIARRHEA: 0
VOMITING: 0
SCLERAL ICTERUS: 0

## 2022-05-25 NOTE — PROGRESS NOTES
[facility]: Established patient - follow up     Chief Complaint   Patient presents with    Follow-up     Reason for Referral: Infiltrating ductal carcinoma   Referring Lesley Duran MD   Family History of Cancer/Hematologic Disorders: Family history is significant for sister with breast cancer at the age of 67. Presenting Symptoms: Abnormal routine bilateral screening mammogram     History of Present Illness:  Ms. Lacy Light is a 77 y.o. female who presents today for follow up regarding breast cancer. 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 She initially presented for a routine bilateral screening mammogram on 2/26/22  which identified left breast asymmetry. Further evaluation with targeted left breast ultrasound was completed on 3/3/22 confirming an irregular  hypoechoic mass at the 1 o'clock position of the left breast in the area of asymmetry as seen on the prior screening mammogram measuring 1.8 cm x 2.1 cm x 1.5 cm  and demonstrating multiple suspicious features. Core needle biopsy and marker clip placement for the 2.1 cm irregular mass at the posterior 1:00 position in the left breast was performed on 3/7/22 with pathology revealing  low grade (well differentiated), infiltrating ductal carcinoma with lobular features and no definite in situ component or lymphovascular invasion identified. Ms. Brunilda Rai is now referred to [facility] for  Medical Oncology evaluation and treatment of newly diagnosed left breast IDC. At consultation, we discussed the pathophysiology of breast cancer,  staging, and the importance of receptor status in terms of treatment options.   We then reviewed her medical history as well as oncologic history, recent imaging and pathology in detail. Today, she is here for FU after sx. Re-excision is planned for +margin/we discussed this today and she will also Fu w Dr Shira Paulino. MRI planned today and she will FU w sx after. We also discussed her OncotypeDx results - benefit of chemotherapy at <1%. We discussed next steps will be sx FU and then XRT consultation and then endocrine therapy. Role of endo tx reviewed in detail going over SE/MOA and duration of tx. I discussed pt's case with Dr Shira Paulino per her wishes after the visit today. Of note, ?memory issues, may be due to anxiety regarding her dx. Chronological Events:   BILATERAL DIGITAL SCREENING MAMMOGRAM WITH CAD AND TOMOSYNTHESIS 2/26/22   FINDINGS: Bilateral digital screening mammography was performed, including digital breast tomosynthesis, and is interpreted in conjunction  with a computer assisted detection (CAD) system.  The breasts are mostly fatty replaced. Left upper outer quadrant posteriorly demonstrates a focal asymmetry and associated architectural distortion. This measures about 1-2 cm but is not well defined. Recommend targeted ultrasound at 1:00-2:00, 9-11 cm from nipple to look for any possible mass or shadowing lesion. No developing suspicious masses, calcifications, or architectural distortion are identified elsewhere in either breast.  There is no  increased skin thickening or nipple retraction.     IMPRESSION: Left asymmetry. Further evaluation is recommended with targeted ultrasound.    BI-RADS Assessment Category 0:  Incomplete: Needs additional imaging evaluation.       LEFT BREAST ULTRASOUND, 3/3/2022   FINDINGS: Focused ultrasound imaging at the 1 o'clock position of the left breast in the area of asymmetry as seen on the prior screening  mammogram shows an irregular hypoechoic mass measuring 1.8 cm x 2.1 cm x 1.5 cm which demonstrates multiple suspicious features to include a taller than wide configuration, irregular angular appearing margins, posterior shadowing, and echogenic halo. Malignancy is not excluded.    IMPRESSION   1. 2.1 cm maximal diameter hypoechoic shadowing mass with multiple suspicious features by mammogram and ultrasound. Further evaluation with ultrasound-guided biopsy is recommended. BI-RADS Assessment Category 4c: Suspicious Finding- Biopsy should be considered.       [facility] SURGICAL PATHOLOGY REPORT 3/7/22   DIAGNOSIS    A: \"LEFT BREAST, 1:00 POSITION, 9 CM FROM NIPPLE, CORE BIOPSY\": INFILTRATING DUCTAL CARCINOMA WITH LOBULAR FEATURES, LOW GRADE (WELL DIFFERENTIATED). DEFINITE IN SITU COMPONENT AND LYMPHOVASCULAR INVASION ARE NOT IDENTIFIED       Other Pertinent Information:    02/16/2021 (COVID-19, Pfizer Purple top, DILUTE for use, 12+ yrs, 30mcg/0.3mL dose)      3/11/22 heme/onc consultation   3/17/22 sx consultation   5/3/22 - LEFT PARTIAL MASTECTOMY WITH WIRE  LOC  LEFT SENTINEL NODE BIOPSY WITH LYMPHATIC MAPPING  5/25/22 FU after sx - path reviewed; OncotypeDx reviewed  5/25/22 MRI       Family History   Problem Relation Age of Onset    Breast Cancer Sister 67    Heart Disease Paternal Grandfather     Heart Disease Paternal Grandmother     Heart Disease Father     Diabetes Mother       Social History     Socioeconomic History    Marital status: Single     Spouse name: None    Number of children: None    Years of education: None    Highest education level: None   Occupational History    None   Tobacco Use    Smoking status: Never Smoker    Smokeless tobacco: Never Used   Substance and Sexual Activity    Alcohol use:  Yes     Alcohol/week: 6.0 standard drinks    Drug use: Not Currently    Sexual activity: None   Other Topics Concern    None   Social History Narrative    None     Social Determinants of Health     Financial Resource Strain:     Difficulty of Paying Living Expenses: Not on file   Food Insecurity:     Worried About Running Out of Food in the Last Year: Not on file    Ran Out of Food in the Last Year: Not on file   Transportation Needs:     Lack of Transportation (Medical): Not on file    Lack of Transportation (Non-Medical): Not on file   Physical Activity:     Days of Exercise per Week: Not on file    Minutes of Exercise per Session: Not on file   Stress:     Feeling of Stress : Not on file   Social Connections:     Frequency of Communication with Friends and Family: Not on file    Frequency of Social Gatherings with Friends and Family: Not on file    Attends [employer] Services: Not on file    Active Member of [address] [place] [address] [employer] or Organizations: Not on file    Attends Club or Organization Meetings: Not on file    Marital Status: Not on file   Intimate Partner Violence:     Fear of Current or Ex-Partner: Not on file    Emotionally Abused: Not on file    Physically Abused: Not on file    Sexually Abused: Not on file   Housing Stability:     Unable to Pay for Housing in the Last Year: Not on file    Number of [place] in the Last Year: Not on file    Unstable Housing in the Last Year: Not on file        Review of Systems   Constitutional: Positive for fatigue. Negative for appetite change, chills, diaphoresis, fever and unexpected weight change. HENT:   Negative for hearing loss, mouth sores, nosebleeds, sore throat, trouble swallowing and voice change. Eyes: Negative for icterus. Respiratory: Negative for chest tightness, hemoptysis, shortness of breath and wheezing. Cardiovascular: Negative for chest pain, leg swelling and palpitations. Gastrointestinal: Negative for abdominal distention, abdominal pain, blood in stool, constipation, diarrhea, nausea and vomiting. Endocrine: Negative for hot flashes. Genitourinary: Negative for difficulty urinating, frequency, vaginal bleeding and vaginal discharge. Musculoskeletal: Positive for arthralgias. Negative for flank pain, gait problem and myalgias.    Skin: Negative for itching, rash and wound. Neurological: Negative for dizziness, extremity weakness, gait problem, headaches and numbness. Psychiatric/Behavioral: Positive for depression. Negative for confusion. The patient is nervous/anxious. Allergies   Allergen Reactions    Hydrocodone-Acetaminophen Itching    Levofloxacin Nausea And Vomiting     Past Medical History:   Diagnosis Date    Anxiety     Arthritis     Bilateral Knees    Glaucoma     Hypercholesterolemia     Thyroid disease     Hypo     Past Surgical History:   Procedure Laterality Date    BACK SURGERY      L5    BREAST BIOPSY Left 5/3/2022    LEFT PARTIAL MASTECTOMY WITH WIRE  LOC performed by Deborah Licea MD at [address] [address]  2021    Dr[facility]    IR CHOLECYSTOSTOMY PERCUTANEOUS COMPLETE      KNEE ARTHROSCOPY Bilateral     US BREAST NEEDLE BIOPSY LEFT Left 3/7/2022    US BREAST NEEDLE BIOPSY LEFT 3/7/2022 [facility]    US GUIDED NEEDLE LOC OF LEFT BREAST Left 5/3/2022    US GUIDED NEEDLE LOC OF LEFT BREAST 5/3/2022 [facility]     Current Outpatient Medications   Medication Sig Dispense Refill    citalopram (CELEXA) 40 MG tablet Take 1 tablet by mouth daily      colestipol (COLESTID) 1 g tablet 1 Tablet      cyanocobalamin 1000 MCG tablet Take 1,000 mcg by mouth daily      levothyroxine (SYNTHROID) 50 MCG tablet Take 50 mcg by mouth every morning (before breakfast)      rosuvastatin (CRESTOR) 40 MG tablet Take 40 mg by mouth      bimatoprost (LUMIGAN) 0.01 % SOLN ophthalmic drops Apply 1 drop to eye      celecoxib (CELEBREX) 200 MG capsule TAKE 1 CAPSULE TWICE DAILY WITH FOOD (Patient not taking: Reported on 5/25/2022)      coenzyme Q10 100 MG CAPS capsule 1 capsule with a meal (Patient not taking: Reported on 5/25/2022)       No current facility-administered medications for this visit. No flowsheet data found.     OBJECTIVE:  /80 Comment: sitting Pulse 89   Temp 97.9 °F (36.6 °C)   Resp 18   Ht 5' 7\" (1.702 m)   Wt 219 lb (99.3 kg)   SpO2 97%   BMI 34.30 kg/m²       ECOG PERFORMANCE STATUS - 1- Restricted in physically strenuous activity but ambulatory and able to carry out work of a light or sedentary nature such as light house work, office work. Pain - 0 - No pain/10. None/Minimal pain - not affecting QOL     Fatigue - No flowsheet data found. Distress - No flowsheet data found. Physical Exam  Vitals reviewed. Exam conducted with a chaperone present. Constitutional:       General: She is not in acute distress. Appearance: Normal appearance. She is not ill-appearing or toxic-appearing. HENT:      Head: Normocephalic and atraumatic. Nose: Nose normal.      Mouth/Throat:      Mouth: Mucous membranes are moist.   Eyes:      General: No scleral icterus. Extraocular Movements: Extraocular movements intact. Conjunctiva/sclera: Conjunctivae normal.   Cardiovascular:      Rate and Rhythm: Normal rate and regular rhythm. Heart sounds: No murmur heard. Pulmonary:      Effort: Pulmonary effort is normal. No respiratory distress. Breath sounds: Normal breath sounds. No wheezing or rales. Chest:   Breasts:      Right: No axillary adenopathy or supraclavicular adenopathy. Left: No axillary adenopathy or supraclavicular adenopathy. Comments: Healed very nicely from sx, no erythema, seroma or s/s of infection   No axillary LAD bilat   Abdominal:      General: There is no distension. Palpations: Abdomen is soft. Tenderness: There is no abdominal tenderness. Musculoskeletal:         General: Normal range of motion. Cervical back: Normal range of motion. Right lower leg: No edema. Left lower leg: No edema. Lymphadenopathy:      Cervical: No cervical adenopathy. Upper Body:      Right upper body: No supraclavicular or axillary adenopathy.       Left upper body: No given to pt for her reference. I discussed pt's case with Dr Suzan Krueger per her wishes after the visit today. Of note, ?memory issues, may be due to anxiety regarding her dx. Defer to PCP        RESUSCITATION DIRECTIVES/HOSPICE CARE: Full Support      RTC after sx or sooner as needed     MDM  Number of Diagnoses or Management Options  Malignant neoplasm of left breast in female, estrogen receptor positive, unspecified site of breast ([facility] [address]): established, improving     Amount and/or Complexity of Data Reviewed  Clinical lab tests: reviewed  Tests in the radiology section of CPT®: reviewed  Review and summarize past medical records: yes  Discuss the patient with other providers: yes (Dr Suzan Krueger )  Independent visualization of images, tracings, or specimens: yes    Risk of Complications, Morbidity, and/or Mortality  Presenting problems: moderate  Diagnostic procedures: low  Management options: moderate        Lab studies and imaging studies were personally reviewed. Pertinent old records were reviewed. Historical:    - we discussed the pathophysiology of breast cancer, staging, and the importance of receptor status in  terms of treatment options. We then reviewed her medical history as well as oncologic history, recent imaging and pathology in detail. We discussed next steps. We used visual aides to augment discussion. She will be referred to sx at this time. - she will be a candidate for endocrine therapy in the future due to her receptor status and we reviewed this today. We also discussed the role of OncotypeDx and its predictive/prognostic value. All questions were asked and answered to the best of my ability. The patient verbalized understanding and agrees with the plan above.               [facility] Dilan Lemus) [facility], [address] [facility]  [address] [place] [address]  Office : [phone]  Fax : [phone]

## 2022-05-25 NOTE — PATIENT INSTRUCTIONS
Patient Instructions from Today's Visit    Reason for Visit:  Follow up breast cancer. Diagnosis Information:  [email or web address] net/about-us/asco-answers-patient-education-materials/jqjx-zoqprvt-ypnp-sheets      Plan:  Reviewed oncotypeDX score, which is low, meaning chemotherapy is NOT recommended for you. Re-excision margins surgery with Dr. Seamus wing. You will have a consult with a radiation oncologist to discuss their recommendations for local therapy. Discussed antihormonal pill (letrozole). After radiation will further discuss anti-hormonal pill to reduce risk of same type of breast cancer from coming back. Follow Up: After radiation. Recent Lab Results:  No visits with results within 2 Day(s) from this visit. Latest known visit with results is:   No results found for any previous visit. Treatment Summary has been discussed and given to patient: n/a        -------------------------------------------------------------------------------------------------------------------  Please call our office at [phone] if you have any  of the following symptoms:   · Fever of 100.5 or greater  · Chills  · Shortness of breath  · Swelling or pain in one leg    After office hours an answering service is available and will contact a provider for emergencies or if you are experiencing any of the above symptoms.  Patient did express an interest in My Chart. My Chart log in information explained on the after visit summary printout at the [employer] [facility] [employer] desk. Dario Reyes RN           letrozole  Pronunciation: LET nora zol  Brand: Femara  What is the most important information I should know about letrozole? You should not use letrozole if you are pregnant. What is letrozole? Letrozole lowers estrogen levels in postmenopausal women, which may slow thegrowth of certain types of breast tumors that need estrogen to grow in the body.   Letrozole is used to treat breast cancer in postmenopausal women. It is oftengiven to women who have been taking tamoxifen (Nolvadex, Soltamox) for 5 years. Letrozole may also be used for purposes not listed in this medication guide. What should I discuss with my healthcare provider before taking letrozole? You should not use letrozole if you are allergic to it. This medicine is for use only in women who can no longer get pregnant. Letrozole can harm an unborn baby. Do not use if you are pregnant. Use effective birth control if you are not past menopause. Keep using birth control for at least 3 weeks after your last dose of letrozole. Tell yourdoctor if you think you may be pregnant. Tell your doctor if you have ever had:   liver disease (especially cirrhosis);   osteoporosis, osteopenia (low bone mineral density);   high cholesterol; or   if you also take tamoxifen. You should not breastfeed while you are using letrozole and for at least 3weeks after your last dose. How should I take letrozole? Follow all directions on your prescription label and read all medication guidesor instruction sheets. Use the medicine exactly as directed. You may take letrozole with or without food. You will need frequent medical tests, and your bone mineral density may alsoneed to be checked. Store at room temperature away from moisture and heat. What happens if I miss a dose? Take the medicine as soon as you can, but skip the missed dose if it is almost time for your next dose. Do not take two doses at one time. What happens if I overdose? Seek emergency medical attention or call the Poison Help line at 1-644.899.9976. What should I avoid while taking letrozole? Avoid driving or hazardous activity until you know how this medicine willaffect you. Your reactions could be impaired. What are the possible side effects of letrozole?   Get emergency medical help if you have signs of an allergic reaction: hives; difficult breathing; swelling of your face, lips, tongue, or throat. Common side effects may include:   hot flashes, warmth or redness in your face or chest;   headache, dizziness, weakness;   bone pain, muscle or joint pain;   swelling, weight gain;   increased sweating; or   increased cholesterol in your blood. This is not a complete list of side effects and others may occur. Call your doctor for medical advice about side effects. You may report side effects toFDA at 5-139-KNG-7558. What other drugs will affect letrozole? Other drugs may affect letrozole, including prescription and over-the-counter medicines, vitamins, and herbal products. Tell your doctor about all yourcurrent medicines and any medicine you start or stop using. Where can I get more information? Your pharmacist can provide more information about letrozole. Remember, keep this and all other medicines out of the reach of children, never share your medicines with others, and use this medication only for the indication prescribed. Every effort has been made to ensure that the information provided by [address] is accurate, up-to-date, and complete, but no guarantee is made to that effect. Drug information contained herein may be time sensitive. [facility] information has been compiled for use by healthcare practitioners and consumers in the [place] and therefore [employer] does not warrant that uses outside of the [place] are appropriate, unless specifically indicated otherwise. [facility]'s drug information does not endorse drugs, diagnose patients or recommend therapy. [facility] drug information is an informational resource designed to assist licensed healthcare practitioners in caring for their patients and/or to serve consumers viewing this service as a supplement to, and not a substitute for, the expertise, skill, knowledge and judgment of healthcare practitioners.  The absence of a warning for a given drug or drug combination in no way should be construed to indicate that the drug or drug combination is safe, effective or appropriate for any given patient. [facility] does not assume any responsibility for any aspect of healthcare administered with the aid of information [facility] provides. The information contained herein is not intended to cover all possible uses, directions, precautions, warnings, drug interactions, allergic reactions, or adverse effects. If you have questions about the drugs you are taking, check with yourdoctor, nurse or pharmacist.  Copyright 9396-1922 [address] Version: 8.02. Revision date: 5/14/2020. Care instructions adapted under license by [facility] ([facility]). If you have questions about a medical condition or this instruction, always ask your healthcare professional. [address] [place] [address] any warranty or liability for your use of this information.

## 2022-05-31 ENCOUNTER — TELEPHONE (OUTPATIENT)
Dept: SURGERY | Age: 77
End: 2022-05-31

## 2022-06-01 ENCOUNTER — TELEPHONE (OUTPATIENT)
Dept: SURGERY | Age: 77
End: 2022-06-01

## 2022-06-01 NOTE — TELEPHONE ENCOUNTER
Called and informed the pt of the MRI results per [facility]. She voiced understanding and wanted to schedule surgery. I transferred her to Kaylie Gray.

## 2022-06-02 ENCOUNTER — PREP FOR PROCEDURE (OUTPATIENT)
Dept: SURGERY | Age: 77
End: 2022-06-02

## 2022-06-08 ENCOUNTER — ANESTHESIA EVENT (OUTPATIENT)
Dept: SURGERY | Age: 77
End: 2022-06-08
Payer: MEDICARE

## 2022-06-09 ENCOUNTER — HOSPITAL ENCOUNTER (OUTPATIENT)
Age: 77
Setting detail: OUTPATIENT SURGERY
Discharge: HOME | End: 2022-06-09
Attending: SURGERY | Admitting: SURGERY
Payer: MEDICARE

## 2022-06-09 ENCOUNTER — ANESTHESIA (OUTPATIENT)
Dept: SURGERY | Age: 77
End: 2022-06-09
Payer: MEDICARE

## 2022-06-09 VITALS
TEMPERATURE: 98.2 F | RESPIRATION RATE: 16 BRPM | OXYGEN SATURATION: 93 % | HEART RATE: 88 BPM | SYSTOLIC BLOOD PRESSURE: 135 MMHG | DIASTOLIC BLOOD PRESSURE: 65 MMHG | HEIGHT: 67 IN | BODY MASS INDEX: 34.58 KG/M2 | WEIGHT: 220.3 LBS

## 2022-06-09 PROCEDURE — 7100000000 HC PACU RECOVERY - FIRST 15 MIN: Performed by: SURGERY

## 2022-06-09 PROCEDURE — 19301 PARTIAL MASTECTOMY: CPT | Performed by: SURGERY

## 2022-06-09 PROCEDURE — 6360000002 HC RX W HCPCS: Performed by: SURGERY

## 2022-06-09 PROCEDURE — 6360000002 HC RX W HCPCS: Performed by: NURSE ANESTHETIST, CERTIFIED REGISTERED

## 2022-06-09 PROCEDURE — 3600000003 HC SURGERY LEVEL 3 BASE: Performed by: SURGERY

## 2022-06-09 PROCEDURE — 2500000003 HC RX 250 WO HCPCS: Performed by: SURGERY

## 2022-06-09 PROCEDURE — 3600000013 HC SURGERY LEVEL 3 ADDTL 15MIN: Performed by: SURGERY

## 2022-06-09 PROCEDURE — 6360000002 HC RX W HCPCS: Performed by: STUDENT IN AN ORGANIZED HEALTH CARE EDUCATION/TRAINING PROGRAM

## 2022-06-09 PROCEDURE — 2709999900 HC NON-CHARGEABLE SUPPLY: Performed by: SURGERY

## 2022-06-09 PROCEDURE — 2720000010 HC SURG SUPPLY STERILE: Performed by: SURGERY

## 2022-06-09 PROCEDURE — 7100000010 HC PHASE II RECOVERY - FIRST 15 MIN: Performed by: SURGERY

## 2022-06-09 PROCEDURE — 2500000003 HC RX 250 WO HCPCS: Performed by: NURSE ANESTHETIST, CERTIFIED REGISTERED

## 2022-06-09 PROCEDURE — 3700000000 HC ANESTHESIA ATTENDED CARE: Performed by: SURGERY

## 2022-06-09 PROCEDURE — 7100000001 HC PACU RECOVERY - ADDTL 15 MIN: Performed by: SURGERY

## 2022-06-09 PROCEDURE — 3700000001 HC ADD 15 MINUTES (ANESTHESIA): Performed by: SURGERY

## 2022-06-09 PROCEDURE — 2580000003 HC RX 258: Performed by: ANESTHESIOLOGY

## 2022-06-09 PROCEDURE — C1729 CATH, DRAINAGE: HCPCS | Performed by: SURGERY

## 2022-06-09 PROCEDURE — 2500000003 HC RX 250 WO HCPCS: Performed by: STUDENT IN AN ORGANIZED HEALTH CARE EDUCATION/TRAINING PROGRAM

## 2022-06-09 PROCEDURE — 88307 TISSUE EXAM BY PATHOLOGIST: CPT

## 2022-06-09 PROCEDURE — 88305 TISSUE EXAM BY PATHOLOGIST: CPT

## 2022-06-09 PROCEDURE — 6370000000 HC RX 637 (ALT 250 FOR IP): Performed by: ANESTHESIOLOGY

## 2022-06-09 RX ORDER — SODIUM CHLORIDE 0.9 % (FLUSH) 0.9 %
5-40 SYRINGE (ML) INJECTION PRN
Status: DISCONTINUED | OUTPATIENT
Start: 2022-06-09 | End: 2022-06-09 | Stop reason: HOSPADM

## 2022-06-09 RX ORDER — EPHEDRINE SULFATE/0.9% NACL/PF 50 MG/5 ML
SYRINGE (ML) INTRAVENOUS PRN
Status: DISCONTINUED | OUTPATIENT
Start: 2022-06-09 | End: 2022-06-09 | Stop reason: SDUPTHER

## 2022-06-09 RX ORDER — PROCHLORPERAZINE EDISYLATE 5 MG/ML
5 INJECTION INTRAMUSCULAR; INTRAVENOUS
Status: DISCONTINUED | OUTPATIENT
Start: 2022-06-09 | End: 2022-06-09 | Stop reason: HOSPADM

## 2022-06-09 RX ORDER — PROCHLORPERAZINE EDISYLATE 5 MG/ML
5 INJECTION INTRAMUSCULAR; INTRAVENOUS
Status: DISCONTINUED | OUTPATIENT
Start: 2022-06-09 | End: 2022-06-09 | Stop reason: SDUPTHER

## 2022-06-09 RX ORDER — DIPHENHYDRAMINE HYDROCHLORIDE 50 MG/ML
12.5 INJECTION INTRAMUSCULAR; INTRAVENOUS
Status: DISCONTINUED | OUTPATIENT
Start: 2022-06-09 | End: 2022-06-09 | Stop reason: HOSPADM

## 2022-06-09 RX ORDER — HYDROMORPHONE HYDROCHLORIDE 1 MG/ML
0.5 INJECTION, SOLUTION INTRAMUSCULAR; INTRAVENOUS; SUBCUTANEOUS EVERY 5 MIN PRN
Status: DISCONTINUED | OUTPATIENT
Start: 2022-06-09 | End: 2022-06-09 | Stop reason: HOSPADM

## 2022-06-09 RX ORDER — ONDANSETRON 2 MG/ML
INJECTION INTRAMUSCULAR; INTRAVENOUS PRN
Status: DISCONTINUED | OUTPATIENT
Start: 2022-06-09 | End: 2022-06-09 | Stop reason: SDUPTHER

## 2022-06-09 RX ORDER — HALOPERIDOL 5 MG/ML
1 INJECTION INTRAMUSCULAR
Status: DISCONTINUED | OUTPATIENT
Start: 2022-06-09 | End: 2022-06-09 | Stop reason: SDUPTHER

## 2022-06-09 RX ORDER — HYDRALAZINE HYDROCHLORIDE 20 MG/ML
10 INJECTION INTRAMUSCULAR; INTRAVENOUS
Status: DISCONTINUED | OUTPATIENT
Start: 2022-06-09 | End: 2022-06-09 | Stop reason: HOSPADM

## 2022-06-09 RX ORDER — FENTANYL CITRATE 50 UG/ML
100 INJECTION, SOLUTION INTRAMUSCULAR; INTRAVENOUS
Status: DISCONTINUED | OUTPATIENT
Start: 2022-06-09 | End: 2022-06-09 | Stop reason: HOSPADM

## 2022-06-09 RX ORDER — HALOPERIDOL 5 MG/ML
1 INJECTION INTRAMUSCULAR
Status: DISCONTINUED | OUTPATIENT
Start: 2022-06-09 | End: 2022-06-09 | Stop reason: HOSPADM

## 2022-06-09 RX ORDER — BUPIVACAINE HYDROCHLORIDE AND EPINEPHRINE 5; 5 MG/ML; UG/ML
INJECTION, SOLUTION EPIDURAL; INTRACAUDAL; PERINEURAL PRN
Status: DISCONTINUED | OUTPATIENT
Start: 2022-06-09 | End: 2022-06-09 | Stop reason: ALTCHOICE

## 2022-06-09 RX ORDER — LIDOCAINE HYDROCHLORIDE 20 MG/ML
INJECTION, SOLUTION EPIDURAL; INFILTRATION; INTRACAUDAL; PERINEURAL PRN
Status: DISCONTINUED | OUTPATIENT
Start: 2022-06-09 | End: 2022-06-09 | Stop reason: SDUPTHER

## 2022-06-09 RX ORDER — PROPOFOL 10 MG/ML
INJECTION, EMULSION INTRAVENOUS PRN
Status: DISCONTINUED | OUTPATIENT
Start: 2022-06-09 | End: 2022-06-09 | Stop reason: SDUPTHER

## 2022-06-09 RX ORDER — DIPHENHYDRAMINE HYDROCHLORIDE 50 MG/ML
12.5 INJECTION INTRAMUSCULAR; INTRAVENOUS
Status: DISCONTINUED | OUTPATIENT
Start: 2022-06-09 | End: 2022-06-09 | Stop reason: SDUPTHER

## 2022-06-09 RX ORDER — LABETALOL HYDROCHLORIDE 5 MG/ML
10 INJECTION, SOLUTION INTRAVENOUS
Status: DISCONTINUED | OUTPATIENT
Start: 2022-06-09 | End: 2022-06-09 | Stop reason: HOSPADM

## 2022-06-09 RX ORDER — SODIUM CHLORIDE, SODIUM LACTATE, POTASSIUM CHLORIDE, CALCIUM CHLORIDE 600; 310; 30; 20 MG/100ML; MG/100ML; MG/100ML; MG/100ML
INJECTION, SOLUTION INTRAVENOUS CONTINUOUS
Status: DISCONTINUED | OUTPATIENT
Start: 2022-06-09 | End: 2022-06-09 | Stop reason: HOSPADM

## 2022-06-09 RX ORDER — HYDROMORPHONE HYDROCHLORIDE 1 MG/ML
0.25 INJECTION, SOLUTION INTRAMUSCULAR; INTRAVENOUS; SUBCUTANEOUS EVERY 5 MIN PRN
Status: DISCONTINUED | OUTPATIENT
Start: 2022-06-09 | End: 2022-06-09 | Stop reason: HOSPADM

## 2022-06-09 RX ORDER — IPRATROPIUM BROMIDE AND ALBUTEROL SULFATE 2.5; .5 MG/3ML; MG/3ML
1 SOLUTION RESPIRATORY (INHALATION)
Status: DISCONTINUED | OUTPATIENT
Start: 2022-06-09 | End: 2022-06-09 | Stop reason: HOSPADM

## 2022-06-09 RX ORDER — SODIUM CHLORIDE 0.9 % (FLUSH) 0.9 %
5-40 SYRINGE (ML) INJECTION EVERY 12 HOURS SCHEDULED
Status: DISCONTINUED | OUTPATIENT
Start: 2022-06-09 | End: 2022-06-09 | Stop reason: HOSPADM

## 2022-06-09 RX ORDER — SODIUM CHLORIDE 9 MG/ML
INJECTION, SOLUTION INTRAVENOUS PRN
Status: DISCONTINUED | OUTPATIENT
Start: 2022-06-09 | End: 2022-06-09 | Stop reason: HOSPADM

## 2022-06-09 RX ORDER — MIDAZOLAM HYDROCHLORIDE 2 MG/2ML
2 INJECTION, SOLUTION INTRAMUSCULAR; INTRAVENOUS
Status: COMPLETED | OUTPATIENT
Start: 2022-06-09 | End: 2022-06-09

## 2022-06-09 RX ORDER — HYDRALAZINE HYDROCHLORIDE 20 MG/ML
10 INJECTION INTRAMUSCULAR; INTRAVENOUS
Status: DISCONTINUED | OUTPATIENT
Start: 2022-06-09 | End: 2022-06-09 | Stop reason: SDUPTHER

## 2022-06-09 RX ORDER — OXYCODONE HYDROCHLORIDE 5 MG/1
5 TABLET ORAL
Status: COMPLETED | OUTPATIENT
Start: 2022-06-09 | End: 2022-06-09

## 2022-06-09 RX ORDER — LIDOCAINE HYDROCHLORIDE 10 MG/ML
1 INJECTION, SOLUTION INFILTRATION; PERINEURAL
Status: COMPLETED | OUTPATIENT
Start: 2022-06-09 | End: 2022-06-09

## 2022-06-09 RX ORDER — FENTANYL CITRATE 50 UG/ML
INJECTION, SOLUTION INTRAMUSCULAR; INTRAVENOUS PRN
Status: DISCONTINUED | OUTPATIENT
Start: 2022-06-09 | End: 2022-06-09 | Stop reason: SDUPTHER

## 2022-06-09 RX ORDER — HYDROMORPHONE HYDROCHLORIDE 1 MG/ML
0.5 INJECTION, SOLUTION INTRAMUSCULAR; INTRAVENOUS; SUBCUTANEOUS EVERY 5 MIN PRN
Status: DISCONTINUED | OUTPATIENT
Start: 2022-06-09 | End: 2022-06-09 | Stop reason: SDUPTHER

## 2022-06-09 RX ORDER — LABETALOL HYDROCHLORIDE 5 MG/ML
10 INJECTION, SOLUTION INTRAVENOUS
Status: DISCONTINUED | OUTPATIENT
Start: 2022-06-09 | End: 2022-06-09 | Stop reason: SDUPTHER

## 2022-06-09 RX ADMIN — LIDOCAINE HYDROCHLORIDE 100 MG: 20 INJECTION, SOLUTION EPIDURAL; INFILTRATION; INTRACAUDAL; PERINEURAL at 16:39

## 2022-06-09 RX ADMIN — Medication 2 G: at 16:34

## 2022-06-09 RX ADMIN — SODIUM CHLORIDE, POTASSIUM CHLORIDE, SODIUM LACTATE AND CALCIUM CHLORIDE: 600; 310; 30; 20 INJECTION, SOLUTION INTRAVENOUS at 16:32

## 2022-06-09 RX ADMIN — OXYCODONE 5 MG: 5 TABLET ORAL at 17:55

## 2022-06-09 RX ADMIN — ONDANSETRON 4 MG: 2 INJECTION INTRAMUSCULAR; INTRAVENOUS at 16:57

## 2022-06-09 RX ADMIN — Medication 10 MG: at 16:47

## 2022-06-09 RX ADMIN — PROPOFOL 200 MG: 10 INJECTION, EMULSION INTRAVENOUS at 16:50

## 2022-06-09 RX ADMIN — MIDAZOLAM HYDROCHLORIDE 2 MG: 1 INJECTION, SOLUTION INTRAMUSCULAR; INTRAVENOUS at 16:31

## 2022-06-09 RX ADMIN — FENTANYL CITRATE 100 MCG: 50 INJECTION INTRAMUSCULAR; INTRAVENOUS at 16:39

## 2022-06-09 RX ADMIN — LIDOCAINE HYDROCHLORIDE 1 ML: 10 INJECTION, SOLUTION INFILTRATION; PERINEURAL at 15:04

## 2022-06-09 ASSESSMENT — PAIN DESCRIPTION - LOCATION
LOCATION: BREAST
LOCATION: BREAST

## 2022-06-09 ASSESSMENT — PAIN SCALES - GENERAL
PAINLEVEL_OUTOF10: 4
PAINLEVEL_OUTOF10: 4

## 2022-06-09 ASSESSMENT — PAIN DESCRIPTION - ORIENTATION
ORIENTATION: LEFT
ORIENTATION: LEFT

## 2022-06-09 ASSESSMENT — PAIN DESCRIPTION - DESCRIPTORS
DESCRIPTORS: ACHING
DESCRIPTORS: ACHING

## 2022-06-09 ASSESSMENT — PAIN DESCRIPTION - PAIN TYPE: TYPE: SURGICAL PAIN

## 2022-06-09 ASSESSMENT — PAIN - FUNCTIONAL ASSESSMENT: PAIN_FUNCTIONAL_ASSESSMENT: 0-10

## 2022-06-09 ASSESSMENT — PAIN DESCRIPTION - FREQUENCY: FREQUENCY: CONTINUOUS

## 2022-06-09 NOTE — OP NOTE
Operative Note    Date of Surgery: 6/9/2022    Preoperative Diagnosis: Malignant neoplasm of left breast (HCC) [C50.912]     Postoperative Diagnosis: * No post-op diagnosis entered *     Surgeon(s) and Role:  Nixon Faith MD - Primary      Anesthesia: General    Estimated Blood Loss: 30cc    Procedure:   Procedure(s):  REEXCISION OF BREAST MARGINS X 2    Indications:  As detailed in the H&P. Procedure in Detail:  The patient was then brought to the operating room and placed on the table in a supine position with adequate padding to all pressure points and the arm extended laterally. After induction of anesthesia, the left breast was prepped and draped in the usual fashion. The prior incision was reopened sharply. Cautery was used to dissect to the readily identified prior surgical cavity which was entered and the residual seroma drained. Scissors were then used to re-excise the superficial and lateral  margin(s) to a thickness of approximately 5 millimeters. The posterior margin, focally (+) on initial resection, was at the pectoralis muscle thus additional resection was abandoned and the entire margin was liberally cauterized. Suture was used to orient the specimen. The cavity was irrigated and  hemostasis was achieved with cautery. The area was infiltrated with local and closed with interrupted subcutaneous 3-0 Vicryl and 4-0 Vicryl subcuticular sutures. Steri-Strips were applied to the wound. A gauze pressure dressing was applied to the breast. Sponge and needle counts were correct times two. The patient tolerated the procedure well and was transferred to recovery room in stable condition.                Specimens:   ID Type Source Tests Collected by Time Destination   A : Superficial Margin Tissue Breast SURGICAL PATHOLOGY Nixon Faith MD 6/9/2022 1710    B : Superficial margin Tissue Breast SURGICAL PATHOLOGY Nixon Faith MD 6/9/2022 1710         Signed By: Radha Gregory MD June 9, 2022

## 2022-06-09 NOTE — ANESTHESIA PRE PROCEDURE
Department of Anesthesiology  Preprocedure Note       Name:  Vesta Landry   Age:  77 y.o.  :  1945                                          MRN:  [id number]         Date:  2022      Surgeon: Saadia Garcia):  Deborah Gaviria MD    Procedure: Procedure(s):  REEXCISION OF BREAST MARGINS X 3    Medications prior to admission:   Prior to Admission medications    Medication Sig Start Date End Date Taking? Authorizing Provider   bimatoprost (LUMIGAN) 0.01 % SOLN ophthalmic drops Apply 1 drop to eye 21   Ar Automatic Reconciliation   celecoxib (CELEBREX) 200 MG capsule TAKE 1 CAPSULE TWICE DAILY WITH FOOD  Patient not taking: Reported on 2022    Ar Automatic Reconciliation   citalopram (CELEXA) 40 MG tablet Take 1 tablet by mouth daily    Ar Automatic Reconciliation   coenzyme Q10 100 MG CAPS capsule 1 capsule with a meal    Ar Automatic Reconciliation   cyanocobalamin 1000 MCG tablet Take 1,000 mcg by mouth daily    Ar Automatic Reconciliation   levothyroxine (SYNTHROID) 50 MCG tablet Take 50 mcg by mouth every morning (before breakfast) 22   Ar Automatic Reconciliation   rosuvastatin (CRESTOR) 40 MG tablet Take 40 mg by mouth 22   Ar Automatic Reconciliation       Current medications:    Current Facility-Administered Medications   Medication Dose Route Frequency Provider Last Rate Last Admin    lidocaine 1 % injection 1 mL  1 mL IntraDERmal Once PRN Fabian Panchal MD        fentaNYL (SUBLIMAZE) injection 100 mcg  100 mcg IntraVENous Once PRN Fabian Panchal MD        midazolam PF (VERSED) injection 2 mg  2 mg IntraVENous Once PRN Fabian Panchal MD        ceFAZolin (ANCEF) 2000 mg in sterile water 20 mL IV syringe  2,000 mg IntraVENous On Call Deborah Gaviria MD           Allergies:     Allergies   Allergen Reactions    Hydrocodone-Acetaminophen Itching    Levofloxacin Nausea And Vomiting       Problem List:    Patient Active Problem List   Diagnosis Code    Malignant neoplasm of left breast in female, estrogen receptor positive ([facility] C50.912, Z17.0       Past Medical History:        Diagnosis Date    Anxiety     Arthritis     Bilateral Knees    Glaucoma     Hypercholesterolemia     Thyroid disease     Hypo       Past Surgical History:        Procedure Laterality Date    BACK SURGERY      L5    BREAST BIOPSY Left 5/3/2022    LEFT PARTIAL MASTECTOMY WITH WIRE  LOC performed by Jada Savage MD at [place] [address]  2021    Dr[facility]    IR CHOLECYSTOSTOMY PERCUTANEOUS COMPLETE      KNEE ARTHROSCOPY Bilateral     US BREAST NEEDLE BIOPSY LEFT Left 3/7/2022    US BREAST NEEDLE BIOPSY LEFT 3/7/2022 [facility]    US GUIDED NEEDLE LOC OF LEFT BREAST Left 5/3/2022    US GUIDED NEEDLE LOC OF LEFT BREAST 5/3/2022 [facility]       Social History:    Social History     Tobacco Use    Smoking status: Never Smoker    Smokeless tobacco: Never Used   Substance Use Topics    Alcohol use: Yes     Alcohol/week: 7.0 standard drinks     Types: 7 Glasses of wine per week                                Counseling given: Not Answered      Vital Signs (Current):   Vitals:    06/03/22 1319 06/09/22 1345   BP:  (!) 147/89   Pulse:  80   Resp:  18   Temp:  97.5 °F (36.4 °C)   TempSrc:  Temporal   SpO2:  95%   Weight: 220 lb (99.8 kg) 220 lb 4.8 oz (99.9 kg)   Height: 5' 7\" (1.702 m)                                               BP Readings from Last 3 Encounters:   06/09/22 (!) 147/89   05/25/22 137/80   03/17/22 138/73       NPO Status:                                                                                 BMI:   Wt Readings from Last 3 Encounters:   06/09/22 220 lb 4.8 oz (99.9 kg)   05/25/22 219 lb (99.3 kg)   04/21/22 225 lb (102.1 kg)     Body mass index is 34.5 kg/m².     CBC: No results found for: WBC, RBC, HGB, HCT, MCV, RDW, PLT    CMP: No results found for: NA, K, CL, CO2, BUN, CREATININE, GFRAA, AGRATIO, LABGLOM, GLUCOSE, GLU, PROT, CALCIUM, BILITOT, ALKPHOS, AST, ALT    POC Tests: No results for input(s): POCGLU, POCNA, POCK, POCCL, POCBUN, POCHEMO, POCHCT in the last 72 hours. Coags: No results found for: PROTIME, INR, APTT    HCG (If Applicable): No results found for: PREGTESTUR, PREGSERUM, HCG, HCGQUANT     ABGs: No results found for: PHART, PO2ART, EVK1GIN, SNL9IER, BEART, T1EIWOHO     Type & Screen (If Applicable):  No results found for: LABABO, LABRH    Drug/Infectious Status (If Applicable):  No results found for: HIV, HEPCAB    COVID-19 Screening (If Applicable): No results found for: COVID19        Anesthesia Evaluation  Patient summary reviewed and Nursing notes reviewed  Airway: Mallampati: II  TM distance: >3 FB   Neck ROM: full  Mouth opening: > = 3 FB   Dental:          Pulmonary:Negative Pulmonary ROS breath sounds clear to auscultation                             Cardiovascular:  Exercise tolerance: good (>4 METS),           Rhythm: regular  Rate: normal                    Neuro/Psych:               GI/Hepatic/Renal:        (-) GERD       Endo/Other:    (+) hypothyroidism::., malignancy/cancer (breast). Pt had no PAT visit       Abdominal:             Vascular: negative vascular ROS. Other Findings:           Anesthesia Plan      general     ASA 2       Induction: intravenous. Anesthetic plan and risks discussed with patient and sibling.                         Jarad Thompson MD   6/9/2022

## 2022-06-09 NOTE — PERIOP NOTE
Discharge instructions reviewed with patient family members, states understanding and no questions at this time

## 2022-06-09 NOTE — INTERVAL H&P NOTE
Update History & Physical    The patient's History and Physical was reviewed with the patient and I examined the patient. There was no change. The surgical site was confirmed by the patient and me. Plan: The risks, benefits, expected outcome, and alternative to the recommended procedure have been discussed with the patient. Patient understands and wants to proceed with the procedure.      Kaia Biggs MD  6/9/2022

## 2022-06-10 NOTE — ANESTHESIA POSTPROCEDURE EVALUATION
Department of Anesthesiology  Postprocedure Note    Patient: Vesta Landry  MRN: [id number]  YOB: 1945  Date of evaluation: 6/9/2022  Time:  11:34 PM     Procedure Summary     Date: 06/09/22 Room / Location: [facility] [place] [facility] 02 / [facility] [place] [facility] [place]    Anesthesia Start: 6439 Anesthesia Stop: 4370    Procedure: REEXCISION OF BREAST MARGINS X 3 (Left ) Diagnosis:       Malignant neoplasm of left breast ([address]      (Malignant neoplasm of left breast ([address] [C50.912])    Surgeons: Deborah Gaviria MD Responsible Provider: Derek Stein MD    Anesthesia Type: general ASA Status: 2          Anesthesia Type: No value filed. Ta Phase I: Ta Score: 9    Ta Phase II: Ta Score: 10    Last vitals: Reviewed and per EMR flowsheets.        Anesthesia Post Evaluation    Patient location during evaluation: PACU  Patient participation: complete - patient participated  Level of consciousness: awake and alert  Airway patency: patent  Nausea & Vomiting: no nausea and no vomiting  Complications: no  Cardiovascular status: hemodynamically stable  Respiratory status: acceptable  Hydration status: euvolemic  Multimodal analgesia pain management approach

## 2022-06-14 ENCOUNTER — TELEPHONE (OUTPATIENT)
Dept: SURGERY | Age: 77
End: 2022-06-14

## 2022-06-23 ENCOUNTER — OFFICE VISIT (OUTPATIENT)
Dept: SURGERY | Age: 77
End: 2022-06-23

## 2022-06-23 DIAGNOSIS — C50.412 MALIGNANT NEOPLASM OF UPPER-OUTER QUADRANT OF LEFT BREAST IN FEMALE, ESTROGEN RECEPTOR POSITIVE (HCC): Primary | ICD-10-CM

## 2022-06-23 DIAGNOSIS — Z17.0 MALIGNANT NEOPLASM OF UPPER-OUTER QUADRANT OF LEFT BREAST IN FEMALE, ESTROGEN RECEPTOR POSITIVE (HCC): Primary | ICD-10-CM

## 2022-06-23 PROCEDURE — 99024 POSTOP FOLLOW-UP VISIT: CPT | Performed by: SURGERY

## 2022-06-24 NOTE — PROGRESS NOTES
S/p margin re-excision 6/9  Mildly sore, no concerns    Incision intact  No hematoma/seroma    Path- no residual carcinoma identified. Pt has f/u with oncology; navigators have ordered OncotypDx  Has appt with rad onc 6/29 for her T1cN0    1. Malignant neoplasm of upper-outer quadrant of left breast in female, estrogen receptor positive ([facility]       Follow-up and Dispositions    · Return for as needed.

## 2022-06-29 ENCOUNTER — HOSPITAL ENCOUNTER (OUTPATIENT)
Dept: RADIATION ONCOLOGY | Age: 77
Setting detail: RECURRING SERIES
Discharge: HOME | End: 2022-07-02
Payer: MEDICARE

## 2022-06-29 VITALS
DIASTOLIC BLOOD PRESSURE: 73 MMHG | OXYGEN SATURATION: 98 % | BODY MASS INDEX: 34.33 KG/M2 | SYSTOLIC BLOOD PRESSURE: 138 MMHG | HEART RATE: 71 BPM | WEIGHT: 219.2 LBS | TEMPERATURE: 97.7 F

## 2022-06-29 PROCEDURE — 99211 OFF/OP EST MAY X REQ PHY/QHP: CPT

## 2022-06-29 NOTE — CONSULTS
Patient: Marely Solorio MRN: [id number]  SSN: [id number]    YOB: 1945  Age: 77 y.o. Sex: female      Other Providers:  Dr. Phong Velasco, Dr. Karen Gray: Abnormal screening mammogram    DIAGNOSIS: L breast pT1N0 invasive ductal carcinoma, low grade, ER/NC positive, HER2/ félix negative    PREVIOUS RADIATION TREATMENT:  1) None    HISTORY OF PRESENT ILLNESS:  Marely Solorio is a 77 y.o. female who I am seeing at the request of Dr. Jong Quan. Ms. Chris Benton was in her usual state of health until 2/26/22 at which time she underwent routine screening mammogram. This demonstrated a left upper outer quadrant lesion measuring 1-2cm. Ultrasound was performed 3/3/22 which confirmed a 2.1cm maximal diameter hypoechoic shadowing mass with multiple suspicious features. Biopsy 3/7/22 demonstrated infiltrating ductal carcinoma with lobular features, low grade, ER positive (90%), NC positive (79%), HER2/ félix negative (0). On 5/3/22 she underwent lumpectomy by Dr. Phong Velasco. Final pathology demonstrated residual IDC with lobular features, low grade, approximately 15mm in greatest dimension focally involving the anterior, posterior, and lateral margins. Three sentinel lymph nodes were negative for tumor. MRI 5/25/22 demonstrated postoperative changes in the L upper outer breast and axilla, surrounding enhancement nonspecific but most likely benign and without other discrete suspicious mass in either breast to suggest residual malignancy. She underwent re-excision 6/9/22. The superficial margin was consistent with prior excision change, no residual tumor identified. The superficial margin additional lateral was also consistent with changes from prior excision without residual tumor. She has been evaluated by Dr. Jong Quan who recommended endocrine therapy. Her OncoType was 11.      PAST MEDICAL HISTORY:    Past Medical History:   Diagnosis Date    Anxiety     Arthritis     Bilateral Knees    Glaucoma     Hypercholesterolemia     Thyroid disease     Hypo       The patient denies history of collagen vascular diseases, pacemaker insertion, prior radiation or prior chemotherapy.      PAST SURGICAL HISTORY:   Past Surgical History:   Procedure Laterality Date    BACK SURGERY      L5    BREAST BIOPSY Left 5/3/2022    LEFT PARTIAL MASTECTOMY WITH WIRE  LOC performed by Sharifa Albarado MD at [address] [place] [address] Left 6/9/2022    REEXCISION OF BREAST MARGINS X 3 performed by Riri Martin MD at [place] [address]  2021    [facility]    IR CHOLECYSTOSTOMY PERCUTANEOUS COMPLETE      KNEE ARTHROSCOPY Bilateral     US BREAST NEEDLE BIOPSY LEFT Left 3/7/2022    US BREAST NEEDLE BIOPSY LEFT 3/7/2022 [facility]    US GUIDED NEEDLE LOC OF LEFT BREAST Left 5/3/2022    US GUIDED NEEDLE LOC OF LEFT BREAST 5/3/2022 [facility]       MEDICATIONS:     Current Outpatient Medications:     bimatoprost (LUMIGAN) 0.01 % SOLN ophthalmic drops, Apply 1 drop to eye, Disp: , Rfl:     celecoxib (CELEBREX) 200 MG capsule, TAKE 1 CAPSULE TWICE DAILY WITH FOOD, Disp: , Rfl:     citalopram (CELEXA) 40 MG tablet, Take 1 tablet by mouth daily, Disp: , Rfl:     coenzyme Q10 100 MG CAPS capsule, 1 capsule with a meal, Disp: , Rfl:     cyanocobalamin 1000 MCG tablet, Take 1,000 mcg by mouth daily, Disp: , Rfl:     levothyroxine (SYNTHROID) 50 MCG tablet, Take 50 mcg by mouth every morning (before breakfast), Disp: , Rfl:     rosuvastatin (CRESTOR) 40 MG tablet, Take 40 mg by mouth, Disp: , Rfl:     ALLERGIES:   Allergies   Allergen Reactions    Hydrocodone-Acetaminophen Itching    Levofloxacin Nausea And Vomiting       SOCIAL HISTORY:   Social History     Socioeconomic History    Marital status: Single     Spouse name: Not on file    Number of children: Not on file    Years of education: Not on file    Highest education level: Not on file   Occupational History    Not on file   Tobacco Use    Smoking status: Never Smoker    Smokeless tobacco: Never Used   Substance and Sexual Activity    Alcohol use: Yes     Alcohol/week: 7.0 standard drinks     Types: 7 Glasses of wine per week    Drug use: Not Currently    Sexual activity: Not on file   Other Topics Concern    Not on file   Social History Narrative    Not on file     Social Determinants of Health     Financial Resource Strain:     Difficulty of Paying Living Expenses: Not on file   Food Insecurity:     Worried About Running Out of Food in the Last Year: Not on file    Larry of Food in the Last Year: Not on file   Transportation Needs:     Lack of Transportation (Medical): Not on file    Lack of Transportation (Non-Medical):  Not on file   Physical Activity:     Days of Exercise per Week: Not on file    Minutes of Exercise per Session: Not on file   Stress:     Feeling of Stress : Not on file   Social Connections:     Frequency of Communication with Friends and Family: Not on file    Frequency of Social Gatherings with Friends and Family: Not on file    Attends Jewish Services: Not on file    Active Member of [address] [place] [address] or Organizations: Not on file    Attends Club or Organization Meetings: Not on file    Marital Status: Not on file   Intimate Partner Violence:     Fear of Current or Ex-Partner: Not on file    Emotionally Abused: Not on file    Physically Abused: Not on file    Sexually Abused: Not on file   Housing Stability:     Unable to Pay for Housing in the Last Year: Not on file    Number of [place] in the Last Year: Not on file    Unstable Housing in the Last Year: Not on file       FAMILY HISTORY:   Family History   Problem Relation Age of Onset    Breast Cancer Sister 67    Heart Disease Paternal Grandfather     Heart Disease Paternal Grandmother     Heart Disease Father     Diabetes Mother        REVIEW OF SYSTEMS:   A full 12-point review of systems was completed and was negative unless noted in the history of present illness. PHYSICAL EXAMINATION:   ECOG Performance status 0  VITAL SIGNS: There were no vitals filed for this visit. General: well developed/nourished adult Female in no acute distress; appears stated age  [de-identified]: normocephalic, atraumatic; EOMI  Neck: supple with full ROM  Respiratory: normal inspiratory effort, no audible wheezes  Extremities: no cyanosis, clubbing, or edema  Musculoskeletal: mobility intact x4; normal ROM in all joints  Neuro: AOx3; sensation intact x 4; CNII-XII grossly intact  Psych: appropriate affect, insight, and judgement  GI: abdomen soft, non-distended  Breast: deferred    PATHOLOGY:    I personally reviewed the pathology reports as documented in the HPI. LABORATORY: No results found for: NA, K, CL, CO2, AGAP, GLU, BUN, CREA, GFRAA, CA, MG, PHOS, ALB, TP, ALB, GLOB, ALT  No results found for: WBC, HGB, HCT, PLT    RADIOLOGY:    I personally reviewed the images and agree with the findings as documented in the HPI. IMPRESSION:  Kiya Strange is a 77 y.o. female with R breast invasive ductal carcinoma, low grade, pT1N0 ER/TX positive, HER2/ félix negative. I had a long discussion with Kiya Strange regarding treatment options, specifically adjuvant radiation versus observaton. I first discussed that omission of radiation is dependent on a commitment to taking endocrine therapy. I reviewed results of the CALGB and PRIME II studies demonstrating an approximately 2% vs 9% risk of recurrent disease with vs without radiation  I discussed potential radiation regimens including moderate hypofractionation, extreme hypofractionation, and accelerated partial breast radiation. Based on her clinical features and anatomy she would be an excellent candidate for 5 fractions of 3D whole breast radiation or 5 fractions of IMRT partial breast irradiation.  I reviewed in detail the anticipated acute and late toxicities of radiation therapy as well as  the logistics of treatment and expected disease control. Ulisses Allen had the opportunity to ask questions which appeared to be answered to her satisfaction at this time. She wishes to consider her treatment options and meet with a financial counselor to get a better sense of the cost of adding radiation to her other treatments and will follow up after she has made a final decision.     PLAN:    1) Patient will meet with financial counselor to discuss cost of either 5 fraction of 3D whole breast radiation or 5 fraction IMRT partial breast irradiation  2) She will follow up after she has had time to consider her treatment options and contact our office with any additional questions    Tori Rosenthal MD   June 29, 2022

## 2022-06-29 NOTE — PROGRESS NOTES
Pt is here today for the initial RT consult with Dr. Cody Burkitt for left breast ER positive IDC. Pt is s/p a lumpectomy and re-excision of the surgical margins by Dr. Marcellus Ireland. Pt has an Oncotype score of 11 and does not need chemo. She is followed in Med Onc by Dr. Lynn [place]. Pt may or may not need RT, per Dr. Cody Burkitt, and if pt does decide to have RT it would be no more than 3 weeks of treatment. Pt sees Dr. Lynn [place] again on 8/18/22. An overview of RT was given. Pt is undecided about moving forward with RT and would like to talk with a Martha Ernandez financial counselor about the cost of treatment--pt was referred, and was given the number to call as well. Pt will call Rad Onc about her treatment decision. RT consents were not signed.

## 2022-07-07 ENCOUNTER — HOSPITAL ENCOUNTER (OUTPATIENT)
Dept: RADIATION ONCOLOGY | Age: 77
Setting detail: RECURRING SERIES
Discharge: HOME | End: 2022-07-10
Payer: MEDICARE

## 2022-07-07 ENCOUNTER — HOSPITAL ENCOUNTER (OUTPATIENT)
Dept: RADIATION ONCOLOGY | Age: 77
Setting detail: RECURRING SERIES
End: 2022-07-07
Payer: MEDICARE

## 2022-07-07 PROCEDURE — 77332 RADIATION TREATMENT AID(S): CPT

## 2022-07-07 PROCEDURE — 77290 THER RAD SIMULAJ FIELD CPLX: CPT

## 2022-07-21 ENCOUNTER — HOSPITAL ENCOUNTER (OUTPATIENT)
Dept: RADIATION ONCOLOGY | Age: 77
Setting detail: RECURRING SERIES
Discharge: HOME | End: 2022-07-24
Payer: MEDICARE

## 2022-07-21 PROCEDURE — 77412 RADIATION TX DELIVERY LVL 3: CPT

## 2022-07-21 PROCEDURE — 77280 THER RAD SIMULAJ FIELD SMPL: CPT

## 2022-07-21 PROCEDURE — 77387 GUIDANCE FOR RADJ TX DLVR: CPT

## 2022-07-22 ENCOUNTER — HOSPITAL ENCOUNTER (OUTPATIENT)
Dept: RADIATION ONCOLOGY | Age: 77
Setting detail: RECURRING SERIES
Discharge: HOME | End: 2022-07-25
Payer: MEDICARE

## 2022-07-22 DIAGNOSIS — C50.412 MALIGNANT NEOPLASM OF UPPER-OUTER QUADRANT OF LEFT FEMALE BREAST, UNSPECIFIED ESTROGEN RECEPTOR STATUS (HCC): Primary | ICD-10-CM

## 2022-07-22 DIAGNOSIS — Z12.31 SCREENING MAMMOGRAM FOR HIGH-RISK PATIENT: ICD-10-CM

## 2022-07-22 PROCEDURE — 77387 GUIDANCE FOR RADJ TX DLVR: CPT

## 2022-07-22 PROCEDURE — 77412 RADIATION TX DELIVERY LVL 3: CPT

## 2022-07-25 ENCOUNTER — HOSPITAL ENCOUNTER (OUTPATIENT)
Dept: RADIATION ONCOLOGY | Age: 77
Setting detail: RECURRING SERIES
Discharge: HOME | End: 2022-07-28
Payer: MEDICARE

## 2022-07-25 PROCEDURE — 77387 GUIDANCE FOR RADJ TX DLVR: CPT

## 2022-07-25 PROCEDURE — 77412 RADIATION TX DELIVERY LVL 3: CPT

## 2022-07-25 NOTE — PROGRESS NOTES
Patient: Walter Alcaraz MRN: [id number]  SSN: [id number]    YOB: 1945  Age: 77 y.o. Sex: female      DIAGNOSIS:  L breast pT1N0 invasive ductal carcinoma, low grade, ER/NE positive, HER2/ félix negative    TREATMENT SITE:  L breast    DOSE and FRACTIONATION:  3 of 5 fractions; 1560 of 2600cGy    INTERVAL HISTORY:  Walter Alcaraz is a 77 y.o. female being treated for L breast pT1N0 invasive ductal carcinoma, low grade, ER/NE positive, HER2/ félix negative. Week 1: No complaints. OBJECTIVE:  NAD  There were no vitals filed for this visit. No results found for: NA, K, CL, CO2, AGAP, GLU, BUN, CREA, GFRAA, CA, MG, PHOS, ALB, TP, ALB, GLOB, ALT  No results found for: WBC, HGB, HCT, PLT    ASSESSMENT and PLAN:  Walter Alcaraz is tolerating radiation as anticipated for the current dose and fraction. She completes radiation therapy this week and will follow up in 6 months with repeat mammogram or sooner as needed.     Richar Chery MD   July 25, 2022

## 2022-07-26 ENCOUNTER — HOSPITAL ENCOUNTER (OUTPATIENT)
Dept: RADIATION ONCOLOGY | Age: 77
Setting detail: RECURRING SERIES
Discharge: HOME | End: 2022-07-29
Payer: MEDICARE

## 2022-07-26 PROCEDURE — 77387 GUIDANCE FOR RADJ TX DLVR: CPT

## 2022-07-26 PROCEDURE — 77412 RADIATION TX DELIVERY LVL 3: CPT

## 2022-07-27 ENCOUNTER — HOSPITAL ENCOUNTER (OUTPATIENT)
Dept: RADIATION ONCOLOGY | Age: 77
Setting detail: RECURRING SERIES
Discharge: HOME | End: 2022-07-30
Payer: MEDICARE

## 2022-07-27 PROCEDURE — 77412 RADIATION TX DELIVERY LVL 3: CPT

## 2022-07-27 PROCEDURE — 77387 GUIDANCE FOR RADJ TX DLVR: CPT

## 2022-07-27 PROCEDURE — 77336 RADIATION PHYSICS CONSULT: CPT

## 2022-08-16 ASSESSMENT — ENCOUNTER SYMPTOMS
BLOOD IN STOOL: 0
HEMOPTYSIS: 0
VOICE CHANGE: 0
CONSTIPATION: 0
DIARRHEA: 0
ABDOMINAL PAIN: 0
SORE THROAT: 0
VOMITING: 0
NAUSEA: 0
WHEEZING: 0
ABDOMINAL DISTENTION: 0
SCLERAL ICTERUS: 0
SHORTNESS OF BREATH: 0
CHEST TIGHTNESS: 0
TROUBLE SWALLOWING: 0

## 2022-08-16 NOTE — PROGRESS NOTES
[facility]emat[facility] On[facility]: Established patient - follow up     Chief Complaint   Patient presents with    Follow-up     Reason for Referral: Infiltrating ductal carcinoma   Referring Provider: Aranza Sinclair MD   Family History of Cancer/Hematologic Disorders: Family history is significant for sister with breast cancer at the age of 67. Presenting Symptoms: Abnormal routine bilateral screening mammogram     History of Present Illness:  Ms. Lisandro Uribe is a 77 y.o. female who presents today for follow up regarding breast cancer. 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 She initially presented for a routine bilateral screening mammogram on 2/26/22  which identified left breast asymmetry. Further evaluation with targeted left breast ultrasound was completed on 3/3/22 confirming an irregular  hypoechoic mass at the 1 o'clock position of the left breast in the area of asymmetry as seen on the prior screening mammogram measuring 1.8 cm x 2.1 cm x 1.5 cm  and demonstrating multiple suspicious features. Core needle biopsy and marker clip placement for the 2.1 cm irregular mass at the posterior 1:00 position in the left breast was performed on 3/7/22 with pathology revealing  low grade (well differentiated), infiltrating ductal carcinoma with lobular features and no definite in situ component or lymphovascular invasion identified. Ms. Ailin Urbina is now referred to [facility] for  Medical Oncology evaluation and treatment of newly diagnosed left breast IDC. At consultation, we discussed the pathophysiology of breast cancer,  staging, and the importance of receptor status in terms of treatment options.   We then reviewed her medical history as well as oncologic history, recent imaging and pathology in detail. S/p Re-excision for +margin/w Dr Melara Mo - path neg for malignancy. We discussed her OncotypeDx results - benefit of chemotherapy at <1%. Role of endo tx reviewed in detail going over SE/MOA and duration of tx. Of note, ?memory issues, may be due to anxiety regarding her dx. Today, she is here for follow-up after radiation. She is healing well. Some skin changes noted. She had 5 fractions with Dr. Libra Gray. Tolerated treatment well. Will be starting endocrine therapy. Mechanism of action as well as side effects of the treatment discussed in detail with the patient. Asked for patient to check her blood pressure for a week prior to starting treatment and then once she starts treatment. Printed info given to pt for her reference. Next steps in surveillance reviewed. Chronological Events:   BILATERAL DIGITAL SCREENING MAMMOGRAM WITH CAD AND TOMOSYNTHESIS 2/26/22   FINDINGS: Bilateral digital screening mammography was performed, including digital breast tomosynthesis, and is interpreted in conjunction  with a computer assisted detection (CAD) system. The breasts are mostly fatty replaced. Left upper outer quadrant posteriorly demonstrates a focal asymmetry and associated architectural distortion. This measures about 1-2 cm but is not well defined. Recommend targeted ultrasound at 1:00-2:00, 9-11 cm from nipple to look for any possible mass or shadowing lesion. No developing suspicious masses, calcifications, or architectural distortion are identified elsewhere in either breast.  There is no  increased skin thickening or nipple retraction. IMPRESSION: Left asymmetry. Further evaluation is recommended with targeted ultrasound. BI-RADS Assessment Category 0: Incomplete: Needs additional imaging evaluation.        LEFT BREAST ULTRASOUND, 3/3/2022   FINDINGS: Focused ultrasound imaging at the 1 o'clock position of the left breast in the area of asymmetry as seen on the prior screening  mammogram shows an irregular hypoechoic mass measuring 1.8 cm x 2.1 cm x 1.5 cm which demonstrates multiple suspicious features to include a taller than wide configuration, irregular angular appearing margins, posterior shadowing, and echogenic halo. Malignancy is not excluded. IMPRESSION   1. 2.1 cm maximal diameter hypoechoic shadowing mass with multiple suspicious features by mammogram and ultrasound. Further evaluation with ultrasound-guided biopsy is recommended. BI-RADS Assessment Category 4c: Suspicious Finding- Biopsy should be considered. [facility] SURGICAL PATHOLOGY REPORT 3/7/22   DIAGNOSIS    A: \"LEFT BREAST, 1:00 POSITION, 9 CM FROM NIPPLE, CORE BIOPSY\": INFILTRATING DUCTAL CARCINOMA WITH LOBULAR FEATURES, LOW GRADE (WELL DIFFERENTIATED). DEFINITE IN SITU COMPONENT AND LYMPHOVASCULAR INVASION ARE NOT IDENTIFIED       Other Pertinent Information:    02/16/2021 (COVID-19, Pfizer Purple top, DILUTE for use, 12+ yrs, 30mcg/0.3mL dose)      3/11/22 heme/onc consultation   3/17/22 sx consultation   5/3/22 - LEFT PARTIAL MASTECTOMY WITH WIRE  LOC  LEFT SENTINEL NODE BIOPSY WITH LYMPHATIC MAPPING  5/25/22 FU after sx - path reviewed; OncotypeDx reviewed  5/25/22 MRI - Expected postoperative changes in the left upper outer breast and axilla. Surrounding enhancement is nonspecific but most likely benign reactive change. 2. No discrete suspicious mass in either breast to suggest residual malignancy. 6/9/22 re-excision of breast margins x2   6/29/22 XRT consult - Dr Duenas Cousin   7/2022 left breast - 5 fractions - 2600cGy   8/18/22 FU after XRT;  Rx letrozole - SE/MOA reviewed       Family History   Problem Relation Age of Onset    Breast Cancer Sister 67    Heart Disease Paternal Grandfather     Heart Disease Paternal Grandmother     Heart Disease Father     Diabetes Mother       Social History     Socioeconomic History Marital status: Single     Spouse name: None    Number of children: None    Years of education: None    Highest education level: None   Tobacco Use    Smoking status: Never    Smokeless tobacco: Never   Substance and Sexual Activity    Alcohol use: Yes     Alcohol/week: 7.0 standard drinks     Types: 7 Glasses of wine per week    Drug use: Not Currently        Review of Systems   Constitutional:  Positive for fatigue. Negative for appetite change, chills, diaphoresis, fever and unexpected weight change. HENT:   Negative for hearing loss, mouth sores, nosebleeds, sore throat, trouble swallowing and voice change. Eyes:  Negative for icterus. Respiratory:  Negative for chest tightness, hemoptysis, shortness of breath and wheezing. Cardiovascular:  Negative for chest pain, leg swelling and palpitations. Gastrointestinal:  Negative for abdominal distention, abdominal pain, blood in stool, constipation, diarrhea, nausea and vomiting. Endocrine: Negative for hot flashes. Genitourinary:  Negative for difficulty urinating, frequency, vaginal bleeding and vaginal discharge. Musculoskeletal:  Positive for arthralgias. Negative for flank pain, gait problem and myalgias. Skin:  Negative for itching, rash and wound. Neurological:  Negative for dizziness, extremity weakness, gait problem, headaches and numbness. Psychiatric/Behavioral:  Positive for depression. Negative for confusion. The patient is nervous/anxious.        Allergies   Allergen Reactions    Hydrocodone-Acetaminophen Itching    Levofloxacin Nausea And Vomiting     Past Medical History:   Diagnosis Date    Anxiety     Arthritis     Bilateral Knees    Glaucoma     Hypercholesterolemia     Thyroid disease     Hypo     Past Surgical History:   Procedure Laterality Date    BACK SURGERY      L5    BREAST BIOPSY Left 5/3/2022    LEFT PARTIAL MASTECTOMY WITH WIRE  LOC performed by Sharifa Albarado MD at [facility] Left 6/9/2022 REEXCISION OF BREAST MARGINS X 3 performed by Kayla Phillips MD at [facility]   2021    Dr[facility]    IR CHOLECYSTOSTOMY PERCUTANEOUS COMPLETE      KNEE ARTHROSCOPY Bilateral     US BREAST NEEDLE BIOPSY LEFT Left 3/7/2022    US BREAST NEEDLE BIOPSY LEFT 3/7/2022 [facility]    US GUIDED NEEDLE LOC OF LEFT BREAST Left 5/3/2022    US GUIDED NEEDLE LOC OF LEFT BREAST 5/3/2022 [facility]     Current Outpatient Medications   Medication Sig Dispense Refill    Amoxicillin 500 MG TABS       letrozole (FEMARA) 2.5 MG tablet Take 1 tablet by mouth daily 30 tablet 0    bimatoprost (LUMIGAN) 0.01 % SOLN ophthalmic drops Apply 1 drop to eye      celecoxib (CELEBREX) 200 MG capsule TAKE 1 CAPSULE TWICE DAILY WITH FOOD      citalopram (CELEXA) 40 MG tablet Take 1 tablet by mouth daily      coenzyme Q10 100 MG CAPS capsule 1 capsule with a meal      cyanocobalamin 1000 MCG tablet Take 1,000 mcg by mouth daily      levothyroxine (SYNTHROID) 50 MCG tablet Take 50 mcg by mouth every morning (before breakfast)      rosuvastatin (CRESTOR) 40 MG tablet Take 40 mg by mouth       No current facility-administered medications for this visit. No flowsheet data found. OBJECTIVE:  /74   Pulse 80   Temp 98 °F (36.7 °C)   Resp 16   Ht 5' 7\" (1.702 m)   Wt 214 lb 8 oz (97.3 kg)   SpO2 96%   BMI 33.60 kg/m²       ECOG PERFORMANCE STATUS - 1- Restricted in physically strenuous activity but ambulatory and able to carry out work of a light or sedentary nature such as light house work, office work. Pain - 0 - No pain/10. None/Minimal pain - not affecting QOL     Fatigue - No flowsheet data found. Distress - No flowsheet data found. Physical Exam  Vitals reviewed. Exam conducted with a chaperone present. Constitutional:       General: She is not in acute distress. Appearance: Normal appearance. She is not ill-appearing or toxic-appearing.    HENT: Head: Normocephalic and atraumatic. Nose: Nose normal.      Mouth/Throat:      Mouth: Mucous membranes are moist.   Eyes:      General: No scleral icterus. Extraocular Movements: Extraocular movements intact. Conjunctiva/sclera: Conjunctivae normal.   Cardiovascular:      Rate and Rhythm: Normal rate and regular rhythm. Heart sounds: No murmur heard. Pulmonary:      Effort: Pulmonary effort is normal. No respiratory distress. Breath sounds: Normal breath sounds. No wheezing or rales. Chest:   Breasts:     Right: No axillary adenopathy or supraclavicular adenopathy. Left: No axillary adenopathy or supraclavicular adenopathy. Comments: Healed very nicely from sx, no erythema, seroma or s/s of infection   No axillary LAD bilat   Abdominal:      General: There is no distension. Palpations: Abdomen is soft. Tenderness: There is no abdominal tenderness. Musculoskeletal:         General: Normal range of motion. Cervical back: Normal range of motion. Right lower leg: No edema. Left lower leg: No edema. Lymphadenopathy:      Cervical: No cervical adenopathy. Upper Body:      Right upper body: No supraclavicular or axillary adenopathy. Left upper body: No supraclavicular or axillary adenopathy. Skin:     General: Skin is warm and dry. Coloration: Skin is not jaundiced or pale. Findings: No rash. Neurological:      General: No focal deficit present. Mental Status: She is alert and oriented to person, place, and time. Gait: Gait normal.   Psychiatric:         Behavior: Behavior normal.         Thought Content:  Thought content normal.        Labs:  Recent Results (from the past 168 hour(s))   Comprehensive Metabolic Panel    Collection Time: 08/18/22  8:27 AM   Result Value Ref Range    Sodium 140 136 - 145 mmol/L    Potassium 4.2 3.5 - 5.1 mmol/L    Chloride 110 (H) 98 - 107 mmol/L    CO2 24 21 - 32 mmol/L    Anion Gap 6 (L) 7 - 16 mmol/L    Glucose 93 65 - 100 mg/dL    BUN 18 8 - 23 MG/DL    Creatinine 0.90 0.6 - 1.0 MG/DL    GFR African American >60 >60 ml/min/1.73m2    GFR Non- >60 >60 ml/min/1.73m2    Calcium 9.2 8.3 - 10.4 MG/DL    Total Bilirubin 0.6 0.2 - 1.1 MG/DL    ALT 31 12 - 65 U/L    AST 27 15 - 37 U/L    Alk Phosphatase 77 50 - 136 U/L    Total Protein 7.7 6.3 - 8.2 g/dL    Albumin 3.7 3.2 - 4.6 g/dL    Globulin 4.0 (H) 2.3 - 3.5 g/dL    Albumin/Globulin Ratio 0.9 (L) 1.2 - 3.5     CBC with Auto Differential    Collection Time: 08/18/22  8:27 AM   Result Value Ref Range    WBC 5.0 4.3 - 11.1 K/uL    RBC 4.03 (L) 4.05 - 5.2 M/uL    Hemoglobin 13.5 11.7 - 15.4 g/dL    Hematocrit 39.7 35.8 - 46.3 %    MCV 98.5 (H) 79.6 - 97.8 FL    MCH 33.5 (H) 26.1 - 32.9 PG    MCHC 34.0 31.4 - 35.0 g/dL    RDW 13.2 11.9 - 14.6 %    Platelets 999 309 - 648 K/uL    MPV 9.1 (L) 9.4 - 12.3 FL    nRBC 0.00 0.0 - 0.2 K/uL    Differential Type AUTOMATED      Seg Neutrophils 59 43 - 78 %    Lymphocytes 21 13 - 44 %    Monocytes 14 (H) 4.0 - 12.0 %    Eosinophils % 5 0.5 - 7.8 %    Basophils 1 0.0 - 2.0 %    Immature Granulocytes 0 0.0 - 5.0 %    Segs Absolute 2.9 1.7 - 8.2 K/UL    Absolute Lymph # 1.1 0.5 - 4.6 K/UL    Absolute Mono # 0.7 0.1 - 1.3 K/UL    Absolute Eos # 0.2 0.0 - 0.8 K/UL    Basophils Absolute 0.1 0.0 - 0.2 K/UL    Absolute Immature Granulocyte 0.0 0.0 - 0.5 K/UL       Imaging: reviewed     PATHOLOGY:                          5/2022:              6/2022 resection of margins:         ASSESSMENT:     Diagnosis Orders   1. Estrogen receptor positive status (ER+)  letrozole (FEMARA) 2.5 MG tablet      2. Malignant neoplasm of upper-outer quadrant of left breast in female, estrogen receptor positive ([facility] [address]              Ms. Hartley Halsted is here for FU of breast cancer.        1. Left breast 2.1x1.5cm (per US) 1:00, 9CMFN, IDC with lobular fetures, low grade, ER90/PR79 and Her2 neg, sp core bx    - per Areli Butt, genetics, does not meet NCCN criteria for testing and pt declined visit   - s/p lumpectomy 5/2022 - [id number], + margin - plan is for MRI/re-excision   - oncotypeDx - low score, benefit of chemotherapy <1%, 3% risk of recurrent distant disease on endo tx at 9 years   - s/p XRT with Dr Pablo Dominguez - 5 fractions     - here for follow-up after radiation. She is healing well. Some skin changes noted. She had 5 fractions with Dr. Pablo Dominguez. Tolerated treatment well. - Will be starting endocrine therapy. Mechanism of action as well as side effects of the treatment discussed in detail with the patient. Asked for patient to check her blood pressure for a week prior to starting treatment and then once she starts treatment. Printed info given to pt for her reference. Next steps in surveillance reviewed. - Ais can cause hot flashes, HTN, angina, swelling, fatigue, bone thinning leading to osteoporosis/fractures, joint stiffness, N/V, hair thinning, weight changes, thrombosis and worsening depression to name a few. - Of note, ?memory issues, may be due to anxiety regarding her dx.   Defer to PCP        RESUSCITATION DIRECTIVES/HOSPICE CARE: Full Support      RTC ~2mo or sooner as needed     MDM  Number of Diagnoses or Management Options  Estrogen receptor positive status (ER+): established, improving  Malignant neoplasm of upper-outer quadrant of left breast in female, estrogen receptor positive ([facility] [address]): established, improving     Amount and/or Complexity of Data Reviewed  Clinical lab tests: ordered and reviewed  Tests in the radiology section of CPT®: ordered and reviewed  Tests in the medicine section of CPT®: ordered  Review and summarize past medical records: yes  Independent visualization of images, tracings, or specimens: yes    Risk of Complications, Morbidity, and/or Mortality  Presenting problems: moderate  Diagnostic procedures: low  Management options: moderate        Lab studies and imaging studies were personally reviewed. Pertinent old records were reviewed. Historical:    - we discussed the pathophysiology of breast cancer, staging, and the importance of receptor status in  terms of treatment options. We then reviewed her medical history as well as oncologic history, recent imaging and pathology in detail. We discussed next steps. We used visual aides to augment discussion. She will be referred to sx at this time. - she will be a candidate for endocrine therapy in the future due to her receptor status and we reviewed this today. We also discussed the role of OncotypeDx and its predictive/prognostic value. - here for FU after sx. Re-excision is planned for +margin/we discussed this today and she will also Fu w Dr Behzad Ga. MRI planned today and she will FU w sx after. We also discussed her OncotypeDx results - benefit of chemotherapy at <1%. We discussed next steps will be sx FU and then XRT consultation and then endocrine therapy. Role of endo tx reviewed in detail going over SE/MOA and duration of tx. I discussed pt's case with Dr Behzad Ga per her wishes after the visit today. All questions were asked and answered to the best of my ability. The patient verbalized understanding and agrees with the plan above.               [place] Rimakali Joseph) Kari Pavon, [address] [facility]  [address] [facility] [address], [address] [place] [address]  Office : [phone]  Fax : [phone]

## 2022-08-18 ENCOUNTER — HOSPITAL ENCOUNTER (OUTPATIENT)
Dept: LAB | Age: 77
Discharge: HOME | End: 2022-08-21
Payer: MEDICARE

## 2022-08-18 ENCOUNTER — OFFICE VISIT (OUTPATIENT)
Dept: ONCOLOGY | Age: 77
End: 2022-08-18
Payer: MEDICARE

## 2022-08-18 VITALS
HEIGHT: 67 IN | BODY MASS INDEX: 33.67 KG/M2 | TEMPERATURE: 98 F | HEART RATE: 80 BPM | SYSTOLIC BLOOD PRESSURE: 114 MMHG | OXYGEN SATURATION: 96 % | RESPIRATION RATE: 16 BRPM | DIASTOLIC BLOOD PRESSURE: 74 MMHG | WEIGHT: 214.5 LBS

## 2022-08-18 DIAGNOSIS — C50.912 MALIGNANT NEOPLASM OF LEFT BREAST IN FEMALE, ESTROGEN RECEPTOR POSITIVE, UNSPECIFIED SITE OF BREAST (HCC): ICD-10-CM

## 2022-08-18 DIAGNOSIS — Z17.0 MALIGNANT NEOPLASM OF UPPER-OUTER QUADRANT OF LEFT BREAST IN FEMALE, ESTROGEN RECEPTOR POSITIVE (HCC): Primary | ICD-10-CM

## 2022-08-18 DIAGNOSIS — C50.412 MALIGNANT NEOPLASM OF UPPER-OUTER QUADRANT OF LEFT BREAST IN FEMALE, ESTROGEN RECEPTOR POSITIVE (HCC): Primary | ICD-10-CM

## 2022-08-18 DIAGNOSIS — Z17.0 ESTROGEN RECEPTOR POSITIVE STATUS (ER+): ICD-10-CM

## 2022-08-18 DIAGNOSIS — Z79.811 AROMATASE INHIBITOR USE: ICD-10-CM

## 2022-08-18 DIAGNOSIS — Z17.0 MALIGNANT NEOPLASM OF LEFT BREAST IN FEMALE, ESTROGEN RECEPTOR POSITIVE, UNSPECIFIED SITE OF BREAST (HCC): ICD-10-CM

## 2022-08-18 LAB
ALBUMIN SERPL-MCNC: 3.7 G/DL (ref 3.2–4.6)
ALBUMIN/GLOB SERPL: 0.9 {RATIO} (ref 1.2–3.5)
ALP SERPL-CCNC: 77 U/L (ref 50–136)
ALT SERPL-CCNC: 31 U/L (ref 12–65)
ANION GAP SERPL CALC-SCNC: 6 MMOL/L (ref 7–16)
AST SERPL-CCNC: 27 U/L (ref 15–37)
BASOPHILS # BLD: 0.1 K/UL (ref 0–0.2)
BASOPHILS NFR BLD: 1 % (ref 0–2)
BILIRUB SERPL-MCNC: 0.6 MG/DL (ref 0.2–1.1)
BUN SERPL-MCNC: 18 MG/DL (ref 8–23)
CALCIUM SERPL-MCNC: 9.2 MG/DL (ref 8.3–10.4)
CHLORIDE SERPL-SCNC: 110 MMOL/L (ref 98–107)
CO2 SERPL-SCNC: 24 MMOL/L (ref 21–32)
CREAT SERPL-MCNC: 0.9 MG/DL (ref 0.6–1)
DIFFERENTIAL METHOD BLD: ABNORMAL
EOSINOPHIL # BLD: 0.2 K/UL (ref 0–0.8)
EOSINOPHIL NFR BLD: 5 % (ref 0.5–7.8)
ERYTHROCYTE [DISTWIDTH] IN BLOOD BY AUTOMATED COUNT: 13.2 % (ref 11.9–14.6)
GLOBULIN SER CALC-MCNC: 4 G/DL (ref 2.3–3.5)
GLUCOSE SERPL-MCNC: 93 MG/DL (ref 65–100)
HCT VFR BLD AUTO: 39.7 % (ref 35.8–46.3)
HGB BLD-MCNC: 13.5 G/DL (ref 11.7–15.4)
IMM GRANULOCYTES # BLD AUTO: 0 K/UL (ref 0–0.5)
IMM GRANULOCYTES NFR BLD AUTO: 0 % (ref 0–5)
LYMPHOCYTES # BLD: 1.1 K/UL (ref 0.5–4.6)
LYMPHOCYTES NFR BLD: 21 % (ref 13–44)
MCH RBC QN AUTO: 33.5 PG (ref 26.1–32.9)
MCHC RBC AUTO-ENTMCNC: 34 G/DL (ref 31.4–35)
MCV RBC AUTO: 98.5 FL (ref 79.6–97.8)
MONOCYTES # BLD: 0.7 K/UL (ref 0.1–1.3)
MONOCYTES NFR BLD: 14 % (ref 4–12)
NEUTS SEG # BLD: 2.9 K/UL (ref 1.7–8.2)
NEUTS SEG NFR BLD: 59 % (ref 43–78)
NRBC # BLD: 0 K/UL (ref 0–0.2)
PLATELET # BLD AUTO: 221 K/UL (ref 150–450)
PMV BLD AUTO: 9.1 FL (ref 9.4–12.3)
POTASSIUM SERPL-SCNC: 4.2 MMOL/L (ref 3.5–5.1)
PROT SERPL-MCNC: 7.7 G/DL (ref 6.3–8.2)
RBC # BLD AUTO: 4.03 M/UL (ref 4.05–5.2)
SODIUM SERPL-SCNC: 140 MMOL/L (ref 136–145)
WBC # BLD AUTO: 5 K/UL (ref 4.3–11.1)

## 2022-08-18 PROCEDURE — 80053 COMPREHEN METABOLIC PANEL: CPT

## 2022-08-18 PROCEDURE — 1090F PRES/ABSN URINE INCON ASSESS: CPT | Performed by: INTERNAL MEDICINE

## 2022-08-18 PROCEDURE — G8417 CALC BMI ABV UP PARAM F/U: HCPCS | Performed by: INTERNAL MEDICINE

## 2022-08-18 PROCEDURE — 1123F ACP DISCUSS/DSCN MKR DOCD: CPT | Performed by: INTERNAL MEDICINE

## 2022-08-18 PROCEDURE — 1036F TOBACCO NON-USER: CPT | Performed by: INTERNAL MEDICINE

## 2022-08-18 PROCEDURE — 36415 COLL VENOUS BLD VENIPUNCTURE: CPT

## 2022-08-18 PROCEDURE — G8427 DOCREV CUR MEDS BY ELIG CLIN: HCPCS | Performed by: INTERNAL MEDICINE

## 2022-08-18 PROCEDURE — 85025 COMPLETE CBC W/AUTO DIFF WBC: CPT

## 2022-08-18 PROCEDURE — 99214 OFFICE O/P EST MOD 30 MIN: CPT | Performed by: INTERNAL MEDICINE

## 2022-08-18 PROCEDURE — G8400 PT W/DXA NO RESULTS DOC: HCPCS | Performed by: INTERNAL MEDICINE

## 2022-08-18 PROCEDURE — 3017F COLORECTAL CA SCREEN DOC REV: CPT | Performed by: INTERNAL MEDICINE

## 2022-08-18 RX ORDER — AMOXICILLIN 500 MG/1
TABLET, FILM COATED ORAL
COMMUNITY
Start: 2022-08-11

## 2022-08-18 RX ORDER — LETROZOLE 2.5 MG/1
2.5 TABLET, FILM COATED ORAL DAILY
Qty: 30 TABLET | Refills: 0 | Status: SHIPPED | OUTPATIENT
Start: 2022-08-18

## 2022-08-18 ASSESSMENT — PATIENT HEALTH QUESTIONNAIRE - PHQ9
SUM OF ALL RESPONSES TO PHQ QUESTIONS 1-9: 0
8. MOVING OR SPEAKING SO SLOWLY THAT OTHER PEOPLE COULD HAVE NOTICED. OR THE OPPOSITE, BEING SO FIGETY OR RESTLESS THAT YOU HAVE BEEN MOVING AROUND A LOT MORE THAN USUAL: 0
3. TROUBLE FALLING OR STAYING ASLEEP: 0
SUM OF ALL RESPONSES TO PHQ QUESTIONS 1-9: 0
2. FEELING DOWN, DEPRESSED OR HOPELESS: 0
SUM OF ALL RESPONSES TO PHQ9 QUESTIONS 1 & 2: 0
6. FEELING BAD ABOUT YOURSELF - OR THAT YOU ARE A FAILURE OR HAVE LET YOURSELF OR YOUR FAMILY DOWN: 0
1. LITTLE INTEREST OR PLEASURE IN DOING THINGS: 0
SUM OF ALL RESPONSES TO PHQ QUESTIONS 1-9: 0
4. FEELING TIRED OR HAVING LITTLE ENERGY: 0
7. TROUBLE CONCENTRATING ON THINGS, SUCH AS READING THE NEWSPAPER OR WATCHING TELEVISION: 0
SUM OF ALL RESPONSES TO PHQ QUESTIONS 1-9: 0
5. POOR APPETITE OR OVEREATING: 0
9. THOUGHTS THAT YOU WOULD BE BETTER OFF DEAD, OR OF HURTING YOURSELF: 0

## 2022-08-18 ASSESSMENT — ANXIETY QUESTIONNAIRES
4. TROUBLE RELAXING: 0
6. BECOMING EASILY ANNOYED OR IRRITABLE: 0
2. NOT BEING ABLE TO STOP OR CONTROL WORRYING: 0
IF YOU CHECKED OFF ANY PROBLEMS ON THIS QUESTIONNAIRE, HOW DIFFICULT HAVE THESE PROBLEMS MADE IT FOR YOU TO DO YOUR WORK, TAKE CARE OF THINGS AT HOME, OR GET ALONG WITH OTHER PEOPLE: NOT DIFFICULT AT ALL
GAD7 TOTAL SCORE: 0
7. FEELING AFRAID AS IF SOMETHING AWFUL MIGHT HAPPEN: 0
1. FEELING NERVOUS, ANXIOUS, OR ON EDGE: 0
3. WORRYING TOO MUCH ABOUT DIFFERENT THINGS: 0
5. BEING SO RESTLESS THAT IT IS HARD TO SIT STILL: 0

## 2022-08-18 NOTE — PATIENT INSTRUCTIONS
Patient Instructions from Today's Visit    Reason for Visit:  Follow up visit for breast cancer  S/p left lumpectomy  XRT completed in July      Plan: We recommend taking endocrine/anti-hormonal treatment since your tumor was estrogen receptor positive. The purpose of this is to try to prevent recurrent disease and the goal is to take this for 5 years. We will start with letrozole. Try taking this for 3 weeks or so. Side effects include mood changes, joint aches, hot flashes, increase in blood pressure, decrease in bone density. It is important to check your blood pressure prior to starting letrozole. Also take this daily while you are on it. Normal blood pressure is 120/80 so let us know if it consistently remains above this. Try aloe gel on the areas of your skin irritated from the radiation. Start the letrozole in 1 week. Letrozole        (LET nora zole)    Trade Name: Demetra Amass is the trade name for the generic drug letrozole. In some cases, health care professionals may use the trade name  Femara ® when referring to the generic drug name letrozole. Drug Type:    Letrozole is a hormone therapy. Letrozole is classified as an aromatase inhibitor. (For more detail, see \"How Letrozole Works\" section below). What Letrozole Is Used For:  Letrozole is used to treat breast cancer in postmenopausal women. Note: If a drug has been approved for one use, physicians may elect to use this same drug for other problems if they believe it may be helpful. How Letrozole Is Given:  Letrozole is a pill, taken by mouth. You should take Letrozole at about the same time each day. You may take Letrozole with or without food. If you miss a dose, do not take a double dose the next day. You should not stop taking Letrozole without discussing with your physician.   The amount of Letrozole that you will receive depends on many factors, including your general health or other health problems, and the type of cancer or condition being treated. Your doctor will determine your dose and how long you will be taking Letrozole. Side Effects:  Important things to remember about the side effects of Letrozole:    Most people do not experience all of the side effects listed. Side effects are often predictable in terms of their onset and duration. Side effects are almost always reversible and will go away after treatment is complete. There are many options to help minimize or prevent side effects. There is no relationship between the presence or severity of side effects and the effectiveness of the medication. The following side effects are common (occurring in greater than 30%) for patients taking Letrozole: Hot flashes  High cholesterol  These side effects are less common side effects (occurring in about 10-29%) of patients receiving Letrozole:    Arthralgias (bone and joint pain)  Night sweats  Weight gain  Nausea  Not all side effects are listed above. Some that are rare (occurring in less than 10% of patients) are not listed here. However, you should always inform your health care provider if you experience any unusual symptoms. When to contact your doctor or health care provider: The following symptoms require medical attention, but are not an emergency. Contact your health care provider within 24 hours of noticing any of the following:    Symptoms of recurrent period (vaginal bleeding/spotting)  Always inform your health care provider if you experience any unusual symptoms. Precautions:  Before starting Letrozole treatment, make sure you tell your doctor about any other medications you are taking (including prescription, over-the-counter, vitamins, herbal remedies, etc.). Inform your health care professional if you are pregnant or may be pregnant prior to starting this treatment. Pregnancy category D (Letrozole may be hazardous to the fetus.   Women who are pregnant or become pregnant must be advised of the potential hazard to the fetus). Letrozole is indicated for post-menopausal women. Do not conceive a child (get pregnant) while taking Letrozole. Barrier methods of contraception, such as condoms, are recommended. Discuss with your doctor when you may safely become pregnant or conceive a child after therapy. Do not breast feed while taking Letrozole. Self-Care Tips:  If you are experiencing hot flashes, wearing light clothing, staying in a cool environment, and putting cool cloths on your head may reduce symptoms. Consult your health care provider if these worsen, or become intolerable. Acetaminophen or ibuprofen may help relieve discomfort from generalized aches and pains. However, be sure to talk with your doctor before taking it. Letrozole causes little nausea. But if you should experience nausea, take anti-nausea medications as prescribed by your doctor, and eat small frequent meals. Sucking on lozenges and chewing gum may also help. Avoid sun exposure. Wear SPF 13 (or higher) sunblock and protective clothing. In general, drinking alcoholic beverages should be kept to a minimum or avoided completely. You should discuss this with your doctor. Get plenty of rest.   Maintain good nutrition. If you experience symptoms or side effects, be sure to discuss them with your health care team.  They can prescribe medications and/or offer other suggestions that are effective in managing such problems. Monitoring and Testing: You will be monitored regularly by your doctor while you are taking letrozole, but no special tests or blood tests are required. How Letrozole Works:  Hormones are chemical substances that are produced by glands in the body, which enter the bloodstream and cause effects in other tissues. For example, the hormone testosterone made in the testicles and is responsible for male characteristics such as deepening voice and increased body hair.   The use of hormone therapy to treat cancer is based on the observation that receptors for specific hormones that are needed for cell growth are on the surface of some tumor cells. Hormone therapies work by stopping the production of a certain hormone, blocking hormone receptors, or substituting chemically similar agents for the active hormone, which cannot be used by the tumor cell. The different types of hormone therapies are categorized by their function and/or the type of hormone that is affected. Letrozole is an aromatase inhibitor. This means it blocks the enzyme aromatase (found in the body's muscle, skin, breast and fat), which is used to convert androgens (hormones produced by the adrenal glands) into estrogen. In the absence of estrogen, tumors dependent on this hormone for growth will shrink. Note:  We strongly encourage you to talk with your health care professional about your specific medical condition and treatments. The information contained in this website is meant to be helpful and educational, but is not a substitute for medical advice.       Update 8/29/2012       Follow Up:  -    Recent Lab Results:  Hospital Outpatient Visit on 08/18/2022   Component Date Value Ref Range Status    Sodium 08/18/2022 140  136 - 145 mmol/L Final    Potassium 08/18/2022 4.2  3.5 - 5.1 mmol/L Final    Chloride 08/18/2022 110 (A) 98 - 107 mmol/L Final    CO2 08/18/2022 24  21 - 32 mmol/L Final    Anion Gap 08/18/2022 6 (A) 7 - 16 mmol/L Final    Glucose 08/18/2022 93  65 - 100 mg/dL Final    BUN 08/18/2022 18  8 - 23 MG/DL Final    Creatinine 08/18/2022 0.90  0.6 - 1.0 MG/DL Final    GFR  08/18/2022 >60  >60 ml/min/1.73m2 Final    GFR Non- 08/18/2022 >60  >60 ml/min/1.73m2 Final    Comment:      Estimated GFR is calculated using the Modification of Diet in Renal Disease (MDRD) Study equation, reported for both  Americans (GFRAA) and non- Americans (GFRNA), and normalized to 1.73m2 body surface area. The physician must decide which value applies to the patient. The MDRD study equation should only be used in individuals age 25 or older. It has not been validated for the following: pregnant women, patients with serious comorbid conditions,or on certain medications, or persons with extremes of body size, muscle mass, or nutritional status.       Calcium 08/18/2022 9.2  8.3 - 10.4 MG/DL Final    Total Bilirubin 08/18/2022 0.6  0.2 - 1.1 MG/DL Final    ALT 08/18/2022 31  12 - 65 U/L Final    AST 08/18/2022 27  15 - 37 U/L Final    Alk Phosphatase 08/18/2022 77  50 - 136 U/L Final    Total Protein 08/18/2022 7.7  6.3 - 8.2 g/dL Final    Albumin 08/18/2022 3.7  3.2 - 4.6 g/dL Final    Globulin 08/18/2022 4.0 (A) 2.3 - 3.5 g/dL Final    Albumin/Globulin Ratio 08/18/2022 0.9 (A) 1.2 - 3.5   Final    WBC 08/18/2022 5.0  4.3 - 11.1 K/uL Final    RBC 08/18/2022 4.03 (A) 4.05 - 5.2 M/uL Final    Hemoglobin 08/18/2022 13.5  11.7 - 15.4 g/dL Final    Hematocrit 08/18/2022 39.7  35.8 - 46.3 % Final    MCV 08/18/2022 98.5 (A) 79.6 - 97.8 FL Final    MCH 08/18/2022 33.5 (A) 26.1 - 32.9 PG Final    MCHC 08/18/2022 34.0  31.4 - 35.0 g/dL Final    RDW 08/18/2022 13.2  11.9 - 14.6 % Final    Platelets 84/72/6877 221  150 - 450 K/uL Final    MPV 08/18/2022 9.1 (A) 9.4 - 12.3 FL Final    nRBC 08/18/2022 0.00  0.0 - 0.2 K/uL Final    **Note: Absolute NRBC parameter is now reported with Hemogram**    Differential Type 08/18/2022 AUTOMATED    Final    Seg Neutrophils 08/18/2022 59  43 - 78 % Final    Lymphocytes 08/18/2022 21  13 - 44 % Final    Monocytes 08/18/2022 14 (A) 4.0 - 12.0 % Final    Eosinophils % 08/18/2022 5  0.5 - 7.8 % Final    Basophils 08/18/2022 1  0.0 - 2.0 % Final    Immature Granulocytes 08/18/2022 0  0.0 - 5.0 % Final    Segs Absolute 08/18/2022 2.9  1.7 - 8.2 K/UL Final    Absolute Lymph # 08/18/2022 1.1  0.5 - 4.6 K/UL Final    Absolute Mono # 08/18/2022 0.7  0.1 - 1.3 K/UL Final    Absolute Eos # 08/18/2022 0.2  0.0 - 0.8 K/UL Final    Basophils Absolute 08/18/2022 0.1  0.0 - 0.2 K/UL Final    Absolute Immature Granulocyte 08/18/2022 0.0  0.0 - 0.5 K/UL Final        Treatment Summary has been discussed and given to patient: n/a        -------------------------------------------------------------------------------------------------------------------  Please call our office at [phone] if you have any  of the following symptoms:   Fever of 100.5 or greater  Chills  Shortness of breath  Swelling or pain in one leg    After office hours an answering service is available and will contact a provider for emergencies or if you are experiencing any of the above symptoms. Patient did express an interest in My Chart. My Chart log in information explained on the after visit summary printout at the . [facility] [address] [address] desk.     Live Denny RN  +

## 2022-09-21 DIAGNOSIS — Z17.0 ESTROGEN RECEPTOR POSITIVE STATUS (ER+): ICD-10-CM

## 2022-09-27 DIAGNOSIS — Z17.0 MALIGNANT NEOPLASM OF UPPER-OUTER QUADRANT OF LEFT BREAST IN FEMALE, ESTROGEN RECEPTOR POSITIVE (HCC): Primary | ICD-10-CM

## 2022-09-27 DIAGNOSIS — C50.412 MALIGNANT NEOPLASM OF UPPER-OUTER QUADRANT OF LEFT BREAST IN FEMALE, ESTROGEN RECEPTOR POSITIVE (HCC): Primary | ICD-10-CM

## 2022-11-01 ENCOUNTER — TELEPHONE (OUTPATIENT)
Dept: ONCOLOGY | Age: 77
End: 2022-11-01

## 2022-11-08 ASSESSMENT — ENCOUNTER SYMPTOMS
HEMOPTYSIS: 0
CONSTIPATION: 0
WHEEZING: 0
SORE THROAT: 0
VOMITING: 0
DIARRHEA: 0
SHORTNESS OF BREATH: 0
ABDOMINAL DISTENTION: 0
SCLERAL ICTERUS: 0
CHEST TIGHTNESS: 0
ABDOMINAL PAIN: 0
TROUBLE SWALLOWING: 0
VOICE CHANGE: 0
NAUSEA: 0
BLOOD IN STOOL: 0

## 2022-11-08 NOTE — PROGRESS NOTES
[facility] Hematology and Oncology: Established patient - follow up     Chief Complaint   Patient presents with    Follow-up     Reason for Referral: Infiltrating ductal carcinoma   Referring Provider: Tao Wood MD   Family History of Cancer/Hematologic Disorders: Family history is significant for sister with breast cancer at the age of 67. Presenting Symptoms: Abnormal routine bilateral screening mammogram     History of Present Illness:  Ms. Leydi Sorenson is a 69 y.o. female who presents today for follow up regarding breast cancer. 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 She initially presented for a routine bilateral screening mammogram on 2/26/22  which identified left breast asymmetry. Further evaluation with targeted left breast ultrasound was completed on 3/3/22 confirming an irregular  hypoechoic mass at the 1 o'clock position of the left breast in the area of asymmetry as seen on the prior screening mammogram measuring 1.8 cm x 2.1 cm x 1.5 cm  and demonstrating multiple suspicious features. Core needle biopsy and marker clip placement for the 2.1 cm irregular mass at the posterior 1:00 position in the left breast was performed on 3/7/22 with pathology revealing  low grade (well differentiated), infiltrating ductal carcinoma with lobular features and no definite in situ component or lymphovascular invasion identified. Ms. Rocky Arguello is now referred to [facility] for  Medical Oncology evaluation and treatment of newly diagnosed left breast IDC. At consultation, we discussed the pathophysiology of breast cancer,  staging, and the importance of receptor status in terms of treatment options.   We then reviewed her medical history as well as oncologic history, recent imaging and pathology in detail. S/p Re-excision for +margin/w Dr Aiken Net - path neg for malignancy. We discussed her OncotypeDx results - benefit of chemotherapy at <1%. Role of endo tx reviewed in detail going over SE/MOA and duration of tx. Of note, ?memory issues, may be due to anxiety regarding her dx. She is here today for follow up, now on AI - Letrozole. She has been okay overall. She missed her appointment here in September. She called to reschedule this as she noticed a new breast lump in the left upper breast.  She has tolerated the Letrozole well thus far. She had hot flashes/joint aches prior - these are stable and no worse since starting the Letrozole. In regards to the new breast lump, she states she regularly does self examinations and first noticed the new lump about 1 week ago. She has had no issues with appetite, weight down 2# since August.  She denies any shortness of breath. She denies any new aches or pains. She denies any fevers or other infectious symptoms. Will plan for US/DX mammo now d/t new breast lump left superior (12 o'clock) lateral to prior lumpectomy site. Chronological Events:   BILATERAL DIGITAL SCREENING MAMMOGRAM WITH CAD AND TOMOSYNTHESIS 2/26/22   FINDINGS: Bilateral digital screening mammography was performed, including digital breast tomosynthesis, and is interpreted in conjunction  with a computer assisted detection (CAD) system. The breasts are mostly fatty replaced. Left upper outer quadrant posteriorly demonstrates a focal asymmetry and associated architectural distortion. This measures about 1-2 cm but is not well defined. Recommend targeted ultrasound at 1:00-2:00, 9-11 cm from nipple to look for any possible mass or shadowing lesion.  No developing suspicious masses, calcifications, or architectural distortion are identified elsewhere in either breast.  There is no  increased skin thickening or nipple retraction. IMPRESSION: Left asymmetry. Further evaluation is recommended with targeted ultrasound. BI-RADS Assessment Category 0: Incomplete: Needs additional imaging evaluation. LEFT BREAST ULTRASOUND, 3/3/2022   FINDINGS: Focused ultrasound imaging at the 1 o'clock position of the left breast in the area of asymmetry as seen on the prior screening  mammogram shows an irregular hypoechoic mass measuring 1.8 cm x 2.1 cm x 1.5 cm which demonstrates multiple suspicious features to include a taller than wide configuration, irregular angular appearing margins, posterior shadowing, and echogenic halo. Malignancy is not excluded. IMPRESSION   1. 2.1 cm maximal diameter hypoechoic shadowing mass with multiple suspicious features by mammogram and ultrasound. Further evaluation with ultrasound-guided biopsy is recommended. BI-RADS Assessment Category 4c: Suspicious Finding- Biopsy should be considered. [facility] SURGICAL PATHOLOGY REPORT 3/7/22   DIAGNOSIS    A: \"LEFT BREAST, 1:00 POSITION, 9 CM FROM NIPPLE, CORE BIOPSY\": INFILTRATING DUCTAL CARCINOMA WITH LOBULAR FEATURES, LOW GRADE (WELL DIFFERENTIATED). DEFINITE IN SITU COMPONENT AND LYMPHOVASCULAR INVASION ARE NOT IDENTIFIED       Other Pertinent Information:    02/16/2021 (COVID-19, Pfizer Purple top, DILUTE for use, 12+ yrs, 30mcg/0.3mL dose)      3/11/22 heme/onc consultation   3/17/22 sx consultation   5/3/22 - LEFT PARTIAL MASTECTOMY WITH WIRE  LOC  LEFT SENTINEL NODE BIOPSY WITH LYMPHATIC MAPPING  5/25/22 FU after sx - path reviewed; OncotypeDx reviewed  5/25/22 MRI - Expected postoperative changes in the left upper outer breast and axilla. Surrounding enhancement is nonspecific but most likely benign reactive change. 2. No discrete suspicious mass in either breast to suggest residual malignancy.   6/9/22 re-excision of breast margins x2   6/29/22 XRT consult - Dr Miri Zhang   7/2022 left breast - 5 fractions - 2600cGy 8/18/22 FU after XRT; Rx letrozole - SE/MOA reviewed   11/9/22 FU - now on Letrozole. US/Dx mammo d/t new left breast nodule         Family History   Problem Relation Age of Onset    Breast Cancer Sister 67    Heart Disease Paternal Grandfather     Heart Disease Paternal Grandmother     Heart Disease Father     Diabetes Mother       Social History     Socioeconomic History    Marital status: Single     Spouse name: None    Number of children: None    Years of education: None    Highest education level: None   Tobacco Use    Smoking status: Never    Smokeless tobacco: Never   Substance and Sexual Activity    Alcohol use: Yes     Alcohol/week: 7.0 standard drinks     Types: 7 Glasses of wine per week    Drug use: Not Currently        Review of Systems   Constitutional:  Positive for fatigue. Negative for appetite change, chills, diaphoresis, fever and unexpected weight change. HENT:   Negative for hearing loss, mouth sores, nosebleeds, sore throat, trouble swallowing and voice change. Eyes:  Negative for icterus. Respiratory:  Negative for chest tightness, hemoptysis, shortness of breath and wheezing. Cardiovascular:  Negative for chest pain, leg swelling and palpitations. Gastrointestinal:  Negative for abdominal distention, abdominal pain, blood in stool, constipation, diarrhea, nausea and vomiting. Endocrine: Negative for hot flashes. Genitourinary:  Negative for difficulty urinating, frequency, vaginal bleeding and vaginal discharge. Musculoskeletal:  Positive for arthralgias. Negative for flank pain, gait problem and myalgias. Skin:  Negative for itching, rash and wound. Neurological:  Negative for dizziness, extremity weakness, gait problem, headaches and numbness. Psychiatric/Behavioral:  Positive for depression. Negative for confusion. The patient is nervous/anxious.        Allergies   Allergen Reactions    Hydrocodone-Acetaminophen Itching    Levofloxacin Nausea And Vomiting Past Medical History:   Diagnosis Date    Anxiety     Arthritis     Bilateral Knees    Glaucoma     Hypercholesterolemia     Thyroid disease     Hypo     Past Surgical History:   Procedure Laterality Date    BACK SURGERY      L5    BREAST BIOPSY Left 5/3/2022    LEFT PARTIAL MASTECTOMY WITH WIRE  LOC performed by Eugenia Mitchell MD at [facility] Left 6/9/2022    REEXCISION OF BREAST MARGINS X 3 performed by Jaya Elmore MD at [place] [id number]  2021    Dr[facility]    IR CHOLECYSTOSTOMY PERCUTANEOUS COMPLETE      KNEE ARTHROSCOPY Bilateral     US BREAST NEEDLE BIOPSY LEFT Left 3/7/2022    US BREAST NEEDLE BIOPSY LEFT 3/7/2022 [facility]    US GUIDED NEEDLE LOC OF LEFT BREAST Left 5/3/2022    US GUIDED NEEDLE LOC OF LEFT BREAST 5/3/2022 [facility]     Current Outpatient Medications   Medication Sig Dispense Refill    letrozole (FEMARA) 2.5 MG tablet Take 1 tablet by mouth daily 90 tablet 3    bimatoprost (LUMIGAN) 0.01 % SOLN ophthalmic drops Apply 1 drop to eye      celecoxib (CELEBREX) 200 MG capsule TAKE 1 CAPSULE TWICE DAILY WITH FOOD      citalopram (CELEXA) 40 MG tablet Take 1 tablet by mouth daily      coenzyme Q10 100 MG CAPS capsule 1 capsule with a meal      cyanocobalamin 1000 MCG tablet Take 1,000 mcg by mouth daily      levothyroxine (SYNTHROID) 50 MCG tablet Take 50 mcg by mouth every morning (before breakfast)      rosuvastatin (CRESTOR) 40 MG tablet Take 40 mg by mouth      Amoxicillin 500 MG TABS  (Patient not taking: Reported on 11/9/2022)       No current facility-administered medications for this visit. No flowsheet data found.     OBJECTIVE:  /81   Pulse 90   Temp 98.9 °F (37.2 °C) (Oral)   Resp 16   Ht 5' 7\" (1.702 m)   Wt 212 lb (96.2 kg)   SpO2 97%   BMI 33.20 kg/m²       ECOG PERFORMANCE STATUS - 1- Restricted in physically strenuous activity but ambulatory and able to carry out work of a light or sedentary nature such as light house work, office work. Pain - Pain Score:   0 - No pain (fatigue-0)0 - No pain/10. None/Minimal pain - not affecting QOL     Fatigue - No flowsheet data found. Distress - No flowsheet data found. Physical Exam  Vitals reviewed. Exam conducted with a chaperone present. Constitutional:       General: She is not in acute distress. Appearance: Normal appearance. She is not ill-appearing or toxic-appearing. HENT:      Head: Normocephalic and atraumatic. Nose: Nose normal.      Mouth/Throat:      Mouth: Mucous membranes are moist.   Eyes:      General: No scleral icterus. Extraocular Movements: Extraocular movements intact. Conjunctiva/sclera: Conjunctivae normal.   Cardiovascular:      Rate and Rhythm: Normal rate and regular rhythm. Heart sounds: No murmur heard. Pulmonary:      Effort: Pulmonary effort is normal. No respiratory distress. Breath sounds: Normal breath sounds. No wheezing or rales. Chest:          Comments: Left breast nodule 12 o'clock position lateral to lumpectomy site, no other palpable abnormalities noted   No axillary LAD bilat   Abdominal:      General: There is no distension. Palpations: Abdomen is soft. Tenderness: There is no abdominal tenderness. Musculoskeletal:         General: Normal range of motion. Cervical back: Normal range of motion. Right lower leg: No edema. Left lower leg: No edema. Lymphadenopathy:      Cervical: No cervical adenopathy. Upper Body:      Right upper body: No supraclavicular or axillary adenopathy. Left upper body: No supraclavicular or axillary adenopathy. Skin:     General: Skin is warm and dry. Coloration: Skin is not jaundiced or pale. Findings: No rash. Neurological:      General: No focal deficit present. Mental Status: She is alert and oriented to person, place, and time.       Gait: Gait normal.   Psychiatric: ([facility] [address]  US BREAST COMPLETE LEFT    BUDDY DIGITAL DIAGNOSTIC W OR WO CAD LEFT      2. Aromatase inhibitor use        3. Estrogen receptor positive status (ER+)  letrozole (FEMARA) 2.5 MG tablet            Ms. Candi Ramirez is here for FU of breast cancer. 1. Left breast 2.1x1.5cm (per US) 1:00, 9CMFN, IDC with lobular fetures, low grade, ER90/PR79 and Her2 neg, sp core bx    - per Augustus Posada, genetics, does not meet NCCN criteria for testing and pt declined visit   - s/p lumpectomy 5/2022 - [id number], + margin - plan is for MRI/re-excision   - oncotypeDx - low score, benefit of chemotherapy <1%, 3% risk of recurrent distant disease on endo tx at 9 years   - s/p XRT with Dr Mita Mccormick - 5 fractions   - started Letrozole August 2022    - here for follow-up after starting Letrozole. No increase in hot flashes/joint aches since starting this. - Mammo originally planned for 1/27/2023 by rad onc, will p/w US/DX mammo d/t new 12 o'clock breast nodule left superior breast, lateral to previous lumpectomy site  - [facility] - osteopenia; due 5/2023   - Of note, ?memory issues, may be due to anxiety regarding her dx. Defer to PCP        RESUSCITATION DIRECTIVES/HOSPICE CARE: Full Support      RTC ~3-4 mo or sooner as needed     MDM      Lab studies and imaging studies were personally reviewed. Pertinent old records were reviewed. Historical:    - we discussed the pathophysiology of breast cancer, staging, and the importance of receptor status in  terms of treatment options. We then reviewed her medical history as well as oncologic history, recent imaging and pathology in detail. We discussed next steps. We used visual aides to augment discussion. She will be referred to sx at this time. - she will be a candidate for endocrine therapy in the future due to her receptor status and we reviewed this today. We also discussed the role of OncotypeDx and its predictive/prognostic value.     - here for FU after sx. Re-excision is planned for +margin/we discussed this today and she will also Fu w Dr Liya Pyle. MRI planned today and she will FU w sx after. We also discussed her OncotypeDx results - benefit of chemotherapy at <1%. We discussed next steps will be sx FU and then XRT consultation and then endocrine therapy. Role of endo tx reviewed in detail going over SE/MOA and duration of tx. I discussed pt's case with Dr Liya Pyle per her wishes after the visit today. - Will be starting endocrine therapy. Mechanism of action as well as side effects of the treatment discussed in detail with the patient. Asked for patient to check her blood pressure for a week prior to starting treatment and then once she starts treatment. Printed info given to pt for her reference. Next steps in surveillance reviewed. - Ais can cause hot flashes, HTN, angina, swelling, fatigue, bone thinning leading to osteoporosis/fractures, joint stiffness, N/V, hair thinning, weight changes, thrombosis and worsening depression to name a few. All questions were asked and answered to the best of my ability. The patient verbalized understanding and agrees with the plan above.           Nisha Pope, APRN - NP  [facility] Hematology and Oncology  [address] [place] [address]  Office : [phone]  Fax : [phone]

## 2022-11-09 ENCOUNTER — OFFICE VISIT (OUTPATIENT)
Dept: ONCOLOGY | Age: 77
End: 2022-11-09
Payer: MEDICARE

## 2022-11-09 ENCOUNTER — HOSPITAL ENCOUNTER (OUTPATIENT)
Dept: LAB | Age: 77
Discharge: HOME | End: 2022-11-12
Payer: MEDICARE

## 2022-11-09 VITALS
SYSTOLIC BLOOD PRESSURE: 126 MMHG | HEIGHT: 67 IN | TEMPERATURE: 98.9 F | WEIGHT: 212 LBS | BODY MASS INDEX: 33.27 KG/M2 | RESPIRATION RATE: 16 BRPM | HEART RATE: 90 BPM | DIASTOLIC BLOOD PRESSURE: 81 MMHG | OXYGEN SATURATION: 97 %

## 2022-11-09 DIAGNOSIS — Z17.0 MALIGNANT NEOPLASM OF UPPER-OUTER QUADRANT OF LEFT BREAST IN FEMALE, ESTROGEN RECEPTOR POSITIVE (HCC): Primary | ICD-10-CM

## 2022-11-09 DIAGNOSIS — Z17.0 MALIGNANT NEOPLASM OF UPPER-OUTER QUADRANT OF LEFT BREAST IN FEMALE, ESTROGEN RECEPTOR POSITIVE (HCC): ICD-10-CM

## 2022-11-09 DIAGNOSIS — Z79.811 AROMATASE INHIBITOR USE: ICD-10-CM

## 2022-11-09 DIAGNOSIS — C50.412 MALIGNANT NEOPLASM OF UPPER-OUTER QUADRANT OF LEFT BREAST IN FEMALE, ESTROGEN RECEPTOR POSITIVE (HCC): Primary | ICD-10-CM

## 2022-11-09 DIAGNOSIS — C50.412 MALIGNANT NEOPLASM OF UPPER-OUTER QUADRANT OF LEFT BREAST IN FEMALE, ESTROGEN RECEPTOR POSITIVE (HCC): ICD-10-CM

## 2022-11-09 DIAGNOSIS — Z17.0 ESTROGEN RECEPTOR POSITIVE STATUS (ER+): ICD-10-CM

## 2022-11-09 LAB
ALBUMIN SERPL-MCNC: 3.9 G/DL (ref 3.2–4.6)
ALBUMIN/GLOB SERPL: 1.1 {RATIO} (ref 0.4–1.6)
ALP SERPL-CCNC: 78 U/L (ref 50–136)
ALT SERPL-CCNC: 31 U/L (ref 12–65)
ANION GAP SERPL CALC-SCNC: 4 MMOL/L (ref 2–11)
AST SERPL-CCNC: 31 U/L (ref 15–37)
BASOPHILS # BLD: 0.1 K/UL (ref 0–0.2)
BASOPHILS NFR BLD: 1 % (ref 0–2)
BILIRUB SERPL-MCNC: 0.6 MG/DL (ref 0.2–1.1)
BUN SERPL-MCNC: 13 MG/DL (ref 8–23)
CALCIUM SERPL-MCNC: 9.8 MG/DL (ref 8.3–10.4)
CHLORIDE SERPL-SCNC: 106 MMOL/L (ref 101–110)
CO2 SERPL-SCNC: 25 MMOL/L (ref 21–32)
CREAT SERPL-MCNC: 1 MG/DL (ref 0.6–1)
DIFFERENTIAL METHOD BLD: ABNORMAL
EOSINOPHIL # BLD: 0.2 K/UL (ref 0–0.8)
EOSINOPHIL NFR BLD: 3 % (ref 0.5–7.8)
ERYTHROCYTE [DISTWIDTH] IN BLOOD BY AUTOMATED COUNT: 12.8 % (ref 11.9–14.6)
GLOBULIN SER CALC-MCNC: 3.7 G/DL (ref 2.8–4.5)
GLUCOSE SERPL-MCNC: 95 MG/DL (ref 65–100)
HCT VFR BLD AUTO: 41.3 % (ref 35.8–46.3)
HGB BLD-MCNC: 13.6 G/DL (ref 11.7–15.4)
IMM GRANULOCYTES # BLD AUTO: 0 K/UL (ref 0–0.5)
IMM GRANULOCYTES NFR BLD AUTO: 0 % (ref 0–5)
LYMPHOCYTES # BLD: 1.6 K/UL (ref 0.5–4.6)
LYMPHOCYTES NFR BLD: 24 % (ref 13–44)
MCH RBC QN AUTO: 33.2 PG (ref 26.1–32.9)
MCHC RBC AUTO-ENTMCNC: 32.9 G/DL (ref 31.4–35)
MCV RBC AUTO: 100.7 FL (ref 82–102)
MONOCYTES # BLD: 0.6 K/UL (ref 0.1–1.3)
MONOCYTES NFR BLD: 9 % (ref 4–12)
NEUTS SEG # BLD: 4.3 K/UL (ref 1.7–8.2)
NEUTS SEG NFR BLD: 64 % (ref 43–78)
NRBC # BLD: 0 K/UL (ref 0–0.2)
PLATELET # BLD AUTO: 246 K/UL (ref 150–450)
PMV BLD AUTO: 9.2 FL (ref 9.4–12.3)
POTASSIUM SERPL-SCNC: 4.2 MMOL/L (ref 3.5–5.1)
PROT SERPL-MCNC: 7.6 G/DL (ref 6.3–8.2)
RBC # BLD AUTO: 4.1 M/UL (ref 4.05–5.2)
SODIUM SERPL-SCNC: 135 MMOL/L (ref 133–143)
WBC # BLD AUTO: 6.8 K/UL (ref 4.3–11.1)

## 2022-11-09 PROCEDURE — 99214 OFFICE O/P EST MOD 30 MIN: CPT | Performed by: NURSE PRACTITIONER

## 2022-11-09 PROCEDURE — 1090F PRES/ABSN URINE INCON ASSESS: CPT | Performed by: NURSE PRACTITIONER

## 2022-11-09 PROCEDURE — 85025 COMPLETE CBC W/AUTO DIFF WBC: CPT

## 2022-11-09 PROCEDURE — 1036F TOBACCO NON-USER: CPT | Performed by: NURSE PRACTITIONER

## 2022-11-09 PROCEDURE — G8484 FLU IMMUNIZE NO ADMIN: HCPCS | Performed by: NURSE PRACTITIONER

## 2022-11-09 PROCEDURE — G8417 CALC BMI ABV UP PARAM F/U: HCPCS | Performed by: NURSE PRACTITIONER

## 2022-11-09 PROCEDURE — 1123F ACP DISCUSS/DSCN MKR DOCD: CPT | Performed by: NURSE PRACTITIONER

## 2022-11-09 PROCEDURE — 36415 COLL VENOUS BLD VENIPUNCTURE: CPT

## 2022-11-09 PROCEDURE — G8400 PT W/DXA NO RESULTS DOC: HCPCS | Performed by: NURSE PRACTITIONER

## 2022-11-09 PROCEDURE — 80053 COMPREHEN METABOLIC PANEL: CPT

## 2022-11-09 PROCEDURE — G8427 DOCREV CUR MEDS BY ELIG CLIN: HCPCS | Performed by: NURSE PRACTITIONER

## 2022-11-09 RX ORDER — LETROZOLE 2.5 MG/1
2.5 TABLET, FILM COATED ORAL DAILY
Qty: 90 TABLET | Refills: 3 | Status: SHIPPED | OUTPATIENT
Start: 2022-11-09

## 2022-11-09 ASSESSMENT — PATIENT HEALTH QUESTIONNAIRE - PHQ9
SUM OF ALL RESPONSES TO PHQ QUESTIONS 1-9: 0
SUM OF ALL RESPONSES TO PHQ QUESTIONS 1-9: 0
2. FEELING DOWN, DEPRESSED OR HOPELESS: 0
SUM OF ALL RESPONSES TO PHQ9 QUESTIONS 1 & 2: 0
1. LITTLE INTEREST OR PLEASURE IN DOING THINGS: 0
SUM OF ALL RESPONSES TO PHQ QUESTIONS 1-9: 0
SUM OF ALL RESPONSES TO PHQ QUESTIONS 1-9: 0

## 2022-11-25 ENCOUNTER — TELEPHONE (OUTPATIENT)
Dept: ONCOLOGY | Age: 77
End: 2022-11-25

## 2022-11-25 NOTE — TELEPHONE ENCOUNTER
PT called to report pain in knot in left breast/states pain in also causing back to hurt/mammo scheduled for 12/5/22

## 2022-11-25 NOTE — TELEPHONE ENCOUNTER
Call to the patient and she has pain in her left breast that goes to her back. She denies any pain to her jaw or down her arm. She does not have chest heaviness. If she has any of these symptoms she is to call 911. She has the number to r/s her dx mammogram. She Verbalizes understanding of instructions. Msg to EVER Marvin.

## 2022-11-28 ENCOUNTER — TELEPHONE (OUTPATIENT)
Dept: CASE MANAGEMENT | Age: 77
End: 2022-11-28

## 2022-11-28 ENCOUNTER — HOSPITAL ENCOUNTER (OUTPATIENT)
Dept: MAMMOGRAPHY | Age: 77
Discharge: HOME | End: 2022-12-01
Payer: MEDICARE

## 2022-11-28 ENCOUNTER — APPOINTMENT (OUTPATIENT)
Dept: MAMMOGRAPHY | Age: 77
End: 2022-11-28
Payer: MEDICARE

## 2022-11-28 DIAGNOSIS — C50.412 MALIGNANT NEOPLASM OF UPPER-OUTER QUADRANT OF LEFT BREAST IN FEMALE, ESTROGEN RECEPTOR POSITIVE (HCC): ICD-10-CM

## 2022-11-28 DIAGNOSIS — N64.4 BREAST PAIN, LEFT: ICD-10-CM

## 2022-11-28 DIAGNOSIS — Z85.3 HISTORY OF LEFT BREAST CANCER: ICD-10-CM

## 2022-11-28 DIAGNOSIS — Z85.3 HISTORY OF LEFT BREAST CANCER: Primary | ICD-10-CM

## 2022-11-28 DIAGNOSIS — Z17.0 MALIGNANT NEOPLASM OF UPPER-OUTER QUADRANT OF LEFT BREAST IN FEMALE, ESTROGEN RECEPTOR POSITIVE (HCC): ICD-10-CM

## 2022-11-28 PROCEDURE — 77066 DX MAMMO INCL CAD BI: CPT

## 2022-11-28 PROCEDURE — 76642 ULTRASOUND BREAST LIMITED: CPT

## 2022-11-28 RX ORDER — TRAMADOL HYDROCHLORIDE 50 MG/1
50 TABLET ORAL EVERY 6 HOURS PRN
Qty: 20 TABLET | Refills: 0 | Status: SHIPPED | OUTPATIENT
Start: 2022-11-28 | End: 2022-12-03

## 2022-11-28 NOTE — TELEPHONE ENCOUNTER
Patient called this am tearful and wants to move up diagnostic imaging. Kristin Bailey from [facility] arranged to have imaging moved up and notified patient.

## 2022-11-29 ENCOUNTER — TELEPHONE (OUTPATIENT)
Dept: MAMMOGRAPHY | Age: 77
End: 2022-11-29

## 2022-11-29 DIAGNOSIS — C50.912 MALIGNANT NEOPLASM OF LEFT BREAST IN FEMALE, ESTROGEN RECEPTOR POSITIVE, UNSPECIFIED SITE OF BREAST (HCC): Primary | ICD-10-CM

## 2022-11-29 DIAGNOSIS — Z17.0 MALIGNANT NEOPLASM OF LEFT BREAST IN FEMALE, ESTROGEN RECEPTOR POSITIVE, UNSPECIFIED SITE OF BREAST (HCC): Primary | ICD-10-CM

## 2022-11-29 NOTE — TELEPHONE ENCOUNTER
Patient was in the breast center for a stat mammogram and ultrasound follow up form surgery and and radiation, the area is red, hot to touch and extremely painful, Dr. Michael Rodriguez requested to see her this week and will call in medication to help with pain relief.

## 2022-11-30 ENCOUNTER — TELEPHONE (OUTPATIENT)
Dept: SURGERY | Age: 77
End: 2022-11-30

## 2022-11-30 NOTE — TELEPHONE ENCOUNTER
Pt called stating that the seroma is causing her discomfort. She would like to know if he can perform a \"procedure\" tomorrow. I will let Dianne know. She voiced understanding.

## 2022-12-01 ENCOUNTER — OFFICE VISIT (OUTPATIENT)
Dept: SURGERY | Age: 77
End: 2022-12-01
Payer: MEDICARE

## 2022-12-01 ENCOUNTER — HOSPITAL ENCOUNTER (OUTPATIENT)
Dept: LAB | Age: 77
Setting detail: SPECIMEN
Discharge: HOME | End: 2022-12-01
Payer: MEDICARE

## 2022-12-01 VITALS
HEART RATE: 90 BPM | BODY MASS INDEX: 32.23 KG/M2 | WEIGHT: 205.8 LBS | DIASTOLIC BLOOD PRESSURE: 82 MMHG | SYSTOLIC BLOOD PRESSURE: 112 MMHG

## 2022-12-01 DIAGNOSIS — Z90.12 HISTORY OF PARTIAL MASTECTOMY OF LEFT BREAST: ICD-10-CM

## 2022-12-01 DIAGNOSIS — N61.1 LEFT BREAST ABSCESS: Primary | ICD-10-CM

## 2022-12-01 DIAGNOSIS — N61.1 BREAST ABSCESS OF FEMALE: Primary | ICD-10-CM

## 2022-12-01 PROCEDURE — 1090F PRES/ABSN URINE INCON ASSESS: CPT | Performed by: SURGERY

## 2022-12-01 PROCEDURE — G8427 DOCREV CUR MEDS BY ELIG CLIN: HCPCS | Performed by: SURGERY

## 2022-12-01 PROCEDURE — 87070 CULTURE OTHR SPECIMN AEROBIC: CPT

## 2022-12-01 PROCEDURE — 1123F ACP DISCUSS/DSCN MKR DOCD: CPT | Performed by: SURGERY

## 2022-12-01 PROCEDURE — G8417 CALC BMI ABV UP PARAM F/U: HCPCS | Performed by: SURGERY

## 2022-12-01 PROCEDURE — G8484 FLU IMMUNIZE NO ADMIN: HCPCS | Performed by: SURGERY

## 2022-12-01 PROCEDURE — G8400 PT W/DXA NO RESULTS DOC: HCPCS | Performed by: SURGERY

## 2022-12-01 PROCEDURE — 10160 PNXR ASPIR ABSC HMTMA BULLA: CPT | Performed by: SURGERY

## 2022-12-01 PROCEDURE — 99212 OFFICE O/P EST SF 10 MIN: CPT | Performed by: SURGERY

## 2022-12-01 PROCEDURE — 1036F TOBACCO NON-USER: CPT | Performed by: SURGERY

## 2022-12-01 PROCEDURE — 87075 CULTR BACTERIA EXCEPT BLOOD: CPT

## 2022-12-01 RX ORDER — SULFAMETHOXAZOLE AND TRIMETHOPRIM 800; 160 MG/1; MG/1
1 TABLET ORAL 2 TIMES DAILY
Qty: 20 TABLET | Refills: 0 | Status: SHIPPED | OUTPATIENT
Start: 2022-12-01 | End: 2022-12-08

## 2022-12-01 ASSESSMENT — ENCOUNTER SYMPTOMS
SINUS PRESSURE: 0
EYE PAIN: 0
SHORTNESS OF BREATH: 0
EYE DISCHARGE: 0
SINUS PAIN: 0
CONSTIPATION: 0
NAUSEA: 0
BLOOD IN STOOL: 0
BACK PAIN: 0
COUGH: 0
DIARRHEA: 0
ABDOMINAL DISTENTION: 0
WHEEZING: 0
COLOR CHANGE: 0
CHOKING: 0
SORE THROAT: 0
EYE ITCHING: 0

## 2022-12-01 NOTE — PROGRESS NOTES
Tyrese Rutherford MD, [facility], [address] [place] [address]  Sofie Lawson  Phone [phone]   Fax [phone]      Date of visit: 12/5/2022     Primary/Requesting provider: Vitaliy Grace MD    Chief Complaint   Patient presents with    Follow-up     FU - Left breast seroma       Patient is a 69 y.o. female who presents for evaluation of a possible LEFT breast cyst or abscess. She is s/p left partial mastectomy/SLN in June with subsequent hypofractionated XRT over 5 days. Since early November (9th?), she has noted increasing pain and swelling of the left breast with significant redness. She reports trying to contact oncology after several days of worsening symptoms. She was eventually started on amoxicillin which she reports was ineffective and she did not tolerate it due to perceived dysuria. She was eventually sent for imaging by oncology navigation and presents now for treatment of an identified seroma/abscess. She does report low grade temp last night. Medications:   Current Outpatient Medications on File Prior to Visit   Medication Sig Dispense Refill    letrozole (FEMARA) 2.5 MG tablet Take 1 tablet by mouth daily 90 tablet 3    bimatoprost (LUMIGAN) 0.01 % SOLN ophthalmic drops Apply 1 drop to eye      celecoxib (CELEBREX) 200 MG capsule TAKE 1 CAPSULE TWICE DAILY WITH FOOD      citalopram (CELEXA) 40 MG tablet Take 1 tablet by mouth daily      coenzyme Q10 100 MG CAPS capsule 1 capsule with a meal      cyanocobalamin 1000 MCG tablet Take 1,000 mcg by mouth daily      levothyroxine (SYNTHROID) 50 MCG tablet Take 50 mcg by mouth every morning (before breakfast)      rosuvastatin (CRESTOR) 40 MG tablet Take 40 mg by mouth      Amoxicillin 500 MG TABS  (Patient not taking: No sig reported)       No current facility-administered medications on file prior to visit. Allergies:    Allergies   Allergen Reactions    Hydrocodone-Acetaminophen Itching    Levofloxacin Nausea And Vomiting        Past History:  Past Medical History:   Diagnosis Date    Anxiety     Arthritis     Bilateral Knees    Glaucoma     Hypercholesterolemia     Thyroid disease     Hypo      Past Surgical History:   Procedure Laterality Date    BACK SURGERY      L5    BREAST BIOPSY Left 5/3/2022    LEFT PARTIAL MASTECTOMY WITH WIRE  LOC performed by Bryce Garcia MD at [facility] Left 06/09/2022    REEXCISION OF BREAST MARGINS X 3 performed by Fab Mitchell MD at [facility] 26      COLONOSCOPY  2021    Dr. [facility]    IR CHOLECYSTOSTOMY PERCUTANEOUS COMPLETE      KNEE ARTHROSCOPY Bilateral     US BREAST NEEDLE BIOPSY LEFT Left 03/07/2022    US BREAST NEEDLE BIOPSY LEFT 3/7/2022 [facility]    US GUIDED NEEDLE LOC OF LEFT BREAST Left 05/03/2022    US GUIDED NEEDLE LOC OF LEFT BREAST 5/3/2022 [facility]        Family and Social History:  Family History   Problem Relation Age of Onset    Breast Cancer Sister 67    Heart Disease Paternal Grandfather     Heart Disease Paternal Grandmother     Heart Disease Father     Diabetes Mother      Social History     Socioeconomic History    Marital status: Single     Spouse name: Not on file    Number of children: Not on file    Years of education: Not on file    Highest education level: Not on file   Occupational History    Not on file   Tobacco Use    Smoking status: Never    Smokeless tobacco: Never   Substance and Sexual Activity    Alcohol use:  Yes     Alcohol/week: 7.0 standard drinks     Types: 7 Glasses of wine per week    Drug use: Not Currently    Sexual activity: Not on file   Other Topics Concern    Not on file   Social History Narrative    Not on file     Social Determinants of Health     Financial Resource Strain: Not on file   Food Insecurity: Not on file   Transportation Needs: Not on file   Physical Activity: Not on file   Stress: Not on file   Social Connections: Not on file Intimate Partner Violence: Not on file   Housing Stability: Not on file             Review of Systems   Constitutional:  Positive for fever. Negative for chills and fatigue. HENT:  Negative for sinus pressure, sinus pain, sneezing and sore throat. Eyes:  Negative for pain, discharge and itching. Glasses   Respiratory:  Negative for cough, choking, shortness of breath and wheezing. Cardiovascular:  Negative for chest pain and palpitations. Gastrointestinal:  Negative for abdominal distention, blood in stool, constipation, diarrhea and nausea. Endocrine: Negative for cold intolerance, heat intolerance and polydipsia. Genitourinary:  Negative for difficulty urinating, dysuria and flank pain. Musculoskeletal:  Negative for back pain, gait problem and joint swelling. Skin:  Negative for color change, pallor and rash. Allergic/Immunologic: Negative for environmental allergies and food allergies. Neurological:  Negative for dizziness, light-headedness and headaches. Hematological:  Negative for adenopathy. Does not bruise/bleed easily. Psychiatric/Behavioral:  Positive for sleep disturbance. The patient is not nervous/anxious. Physical Exam    /82   Pulse 90   Wt 205 lb 12.8 oz (93.4 kg)   BMI 32.23 kg/m²   General Appearance: In no acute distress but clearly uncomfortable   Ears/Nose/Mouth/Throat:   Hearing grossly normal.         Neck: Supple. Chest:   Lungs clear to auscultation bilaterally. Left breast with diffuse erythema, warmth. Nipple not retracted  Severe tenderness UOQ at healed partial mastectomy scar, with underlying fullness c/w fluid collection   Cardiovascular:  Regular rate and rhythm, S1, S2 normal, no murmur. Abdomen:   Soft.    Extremities: No gross deformity    Neuro: alert and oriented x 3            Mammogram Result (most recent):  [facility] DIGITAL DIAGNOSTIC W OR WO CAD BILATERAL 11/28/2022    Narrative  BILATERAL DIAGNOSTIC DIGITAL MAMMOGRAPHY,  LEFT BREAST SONOGRAPHY:    CLINICAL HISTORY:  Worsening left breast palpable lump since November 6 with  interval development of associated worsening, now severe pain and swelling since  November 21in a 67-year-old status post May 2022 left lumpectomy, June 2022  benign reexcision, and radiation therapy completed in August 2022. COMPARISON: MRI of May 25, 2022, left wire localization and lymphoscintigraphy  of May 3, 2022, and earlier studies. BILATERAL MAMMOGRAM: A metallic skin marker was placed at the site of palpable  concern in the left posterior 1:30 lumpectomy bed. Bilateral craniocaudal and  mediolateral oblique as well as left lateral and laterally exaggerated  craniocaudal views demonstrate scattered fibroglandular tissue bilaterally. There has been interval increase in size of a mass in the left lumpectomy bed,  which was shown to be associated with a fluid collection suggestive of  postoperative seroma by MRI on May 25. Increased anterior skin thickening is  nonspecific but consistent with interval radiation therapy. No suspicious  microcalcifications or other evidence of malignancy is seen elsewhere in either  breast.    LEFT ULTRASOUND:  Images from careful ultrasound evaluation of the palpable mass  demonstrated a well-circumscribed fluid collection measuring approximately 8.7 x  5.6 x 8.0 cm which is increased from 7.7 x 3.4 x 4.8 cm by MRI in May 2022. Internal debris or peripheral hyperemia typical of an abscess are not  identified. No adjacent soft tissue mass, abnormal acoustical shadowing, or  other evidence of malignancy is seen. Nevertheless, it should be noted that  mammography and ultrasound fail to detect a small percentage of carcinoma, and  therefore any area of persistent painful or palpable concern must be managed  clinically. Impression  1.  A 9 CM WELL-CIRCUMSCRIBED FLUID COLLECTION IN THE LEFT POSTERIOR 1:30  LUMPECTOMY BED IS INCREASED FROM MRI IN MAY 2022 MAY 2022 AND IS CONSISTENT WITH  A POSTOPERATIVE SEROMA. IF THERE IS CLINICAL SUSPICION FOR SUPERIMPOSED  INFECTION, THEN NEEDLE ASPIRATION WITH ULTRASOUND GUIDANCE COULD PROVIDE  SYMPTOMATIC RELIEF AND FLUID FOR CULTURES. 2.  FOLLOW-UP LEFT BREAST ULTRASOUND IS RECOMMENDED IN 2 MONTHS, UNLESS THE  PALPABLE LUMP RESOLVES IN THE INTERIM OR FURTHER EVALUATION IS CLINICALLY  INDICATED SOONER (SEE ABOVE). BI-RADS Assessment Category 3: Probably benign- Short-interval follow-up  suggested (2 months). ASSESSMENT and PLAN:    1. Breast abscess of female    2. History of partial mastectomy of left breast       Uncertain if findings represent post-XRT mastitis or intraparenchymal breast abscess/infected seroma. Definitive treatment of seroma would be US-guided aspiration, which can also be definitive treatment of a primary breast abscess. Non-guided (in office) FNA may not be completely therapeutic. Pt prefers drainage today due to pain. Will plan FNA and abx. If reaccumulates, may need to consider more definitive intervention (US-guidance of not infected or I&D if abscess). She understands. PROCEDURE:  Left breast UOQ prepped with alcohol.  21g needle used for FNA  75cc aspirated- turbid, whitish first 50cc, then less cloudy and yellower   Bandaid applied  Culture obtained    Orders Placed This Encounter    sulfamethoxazole-trimethoprim (BACTRIM DS;SEPTRA DS) 800-160 MG per tablet     Sig: Take 1 tablet by mouth 2 times daily for 7 days     Dispense:  20 tablet     Refill:  0      Follow-up and Dispositions    Return in about 10 days (around 12/11/2022). Follow-up and Dispositions    Return in about 10 days (around 12/11/2022).

## 2022-12-02 LAB
BACTERIA SPEC CULT: NORMAL
SERVICE CMNT-IMP: NORMAL

## 2022-12-03 LAB
BACTERIA SPEC CULT: NORMAL
GRAM STN SPEC: NORMAL
GRAM STN SPEC: NORMAL
SERVICE CMNT-IMP: NORMAL

## 2022-12-09 LAB
BACTERIA SPEC CULT: NORMAL
SERVICE CMNT-IMP: NORMAL

## 2022-12-13 ENCOUNTER — OFFICE VISIT (OUTPATIENT)
Dept: SURGERY | Age: 77
End: 2022-12-13
Payer: MEDICARE

## 2022-12-13 VITALS
BODY MASS INDEX: 31.37 KG/M2 | HEIGHT: 68 IN | SYSTOLIC BLOOD PRESSURE: 132 MMHG | HEART RATE: 74 BPM | WEIGHT: 207 LBS | DIASTOLIC BLOOD PRESSURE: 77 MMHG

## 2022-12-13 DIAGNOSIS — N61.1 LEFT BREAST ABSCESS: Primary | ICD-10-CM

## 2022-12-13 DIAGNOSIS — Z90.12 HISTORY OF PARTIAL MASTECTOMY OF LEFT BREAST: ICD-10-CM

## 2022-12-13 PROCEDURE — 1090F PRES/ABSN URINE INCON ASSESS: CPT | Performed by: SURGERY

## 2022-12-13 PROCEDURE — G8428 CUR MEDS NOT DOCUMENT: HCPCS | Performed by: SURGERY

## 2022-12-13 PROCEDURE — 1036F TOBACCO NON-USER: CPT | Performed by: SURGERY

## 2022-12-13 PROCEDURE — G8417 CALC BMI ABV UP PARAM F/U: HCPCS | Performed by: SURGERY

## 2022-12-13 PROCEDURE — G8400 PT W/DXA NO RESULTS DOC: HCPCS | Performed by: SURGERY

## 2022-12-13 PROCEDURE — G8484 FLU IMMUNIZE NO ADMIN: HCPCS | Performed by: SURGERY

## 2022-12-13 PROCEDURE — 1123F ACP DISCUSS/DSCN MKR DOCD: CPT | Performed by: SURGERY

## 2022-12-13 PROCEDURE — 99213 OFFICE O/P EST LOW 20 MIN: CPT | Performed by: SURGERY

## 2022-12-13 ASSESSMENT — ENCOUNTER SYMPTOMS
GASTROINTESTINAL NEGATIVE: 1
EYES NEGATIVE: 1
RESPIRATORY NEGATIVE: 1
ALLERGIC/IMMUNOLOGIC NEGATIVE: 1

## 2022-12-13 NOTE — PROGRESS NOTES
Heri Rosenberg MD, [facility], [address] [place] [address]  Sofie Lawson [address]  Phone [phone]   Fax [phone]      Date of visit: 12/13/2022     Primary/Requesting provider: Becky Guadarrama MD    Chief Complaint   Patient presents with    Follow-up     Left breast seroma              Review of Systems   Constitutional: Negative. HENT: Negative. Eyes: Negative. Respiratory: Negative. Cardiovascular: Negative. Gastrointestinal: Negative. Endocrine: Negative. Genitourinary: Negative. Musculoskeletal: Negative. Skin: Negative. Allergic/Immunologic: Negative. Neurological: Negative. Hematological: Negative. Psychiatric/Behavioral: Negative. Physical Exam        ASSESSMENT and PLAN:    No diagnosis found.

## 2022-12-14 NOTE — PROGRESS NOTES
Medical Student Office Note   Name:  Clementine Odell   Age:  69 y.o. Sex:  female   :  1945   MRN:  [id number]     Chief Complaint   Patient presents with    Follow-up     Left breast seroma         Subjective   Ms. Magui Marks is a 70 y/o female who presents to the office today for follow up of left breast seroma s/p left partial mastectomy/SLN in  with subsequent hypofractionated XRT over 5 days that was drained at her last OV on . Pt reports she also sought a second opinion yesterday and had more fluid drained by a surgeon's office at [facility]. She reports she is feeling much better. Denies fevers, chills, NV. Cultures NGTD.        Past Medical History:   Diagnosis Date    Anxiety     Arthritis     Bilateral Knees    Glaucoma     Hypercholesterolemia     Thyroid disease     Hypo        Past Surgical History:   Procedure Laterality Date    BACK SURGERY      L5    BREAST BIOPSY Left 5/3/2022    LEFT PARTIAL MASTECTOMY WITH WIRE  LOC performed by Pierce Bernal MD at  [facility] Left 2022    REEXCISION OF BREAST MARGINS X 3 performed by Destin Brooke MD at [facility]      [facility]    IR CHOLECYSTOSTOMY PERCUTANEOUS COMPLETE      KNEE ARTHROSCOPY Bilateral     US BREAST NEEDLE BIOPSY LEFT Left 2022    US BREAST NEEDLE BIOPSY LEFT 3/7/2022 [facility]    US GUIDED NEEDLE LOC OF LEFT BREAST Left 2022    US GUIDED NEEDLE LOC OF LEFT BREAST 5/3/2022 [facility]        Social History       Tobacco History       Smoking Status  Never      Smokeless Tobacco Use  Never              Alcohol History       Alcohol Use Status  Yes Drinks/Week  7 Glasses of wine per week Amount  7.0 standard drinks of alcohol/wk              Drug Use       Drug Use Status  Not Currently              Sexual Activity       Sexually Active  Not Asked                   Objective:   Physical Exam:   Blood pressure 132/77, pulse 74, height 5' 8\" (1.727 m), weight 207 lb (93.9 kg). General:    Well nourished. No overt distress. Head:  Normocephalic, atraumatic  CV:   RRR. Lungs:   Respirations even, unlabored  Chest:  Left breast with improving erythema, warmth, and tenderness. Extremities: No cyanosis or clubbing. Skin:     Warm and dry. Neuro:  A&O x3  Psych:  Normal mood and affect. Assessment & Plan:   Left breast seroma    Cultures from previous visit with no growth. She is feeling better. No obvious fluid pocket to drain today, given that she has this done yesterday. We will see her back in 2 days to re-evaluate for possible perc drainage. The patient indicates understanding of these issues and agrees with the plan. All questions answered.     Signed:  Mikel Teague OMS-III

## 2022-12-15 ENCOUNTER — OFFICE VISIT (OUTPATIENT)
Dept: SURGERY | Age: 77
End: 2022-12-15
Payer: MEDICARE

## 2022-12-15 VITALS
HEART RATE: 81 BPM | WEIGHT: 204.9 LBS | SYSTOLIC BLOOD PRESSURE: 146 MMHG | DIASTOLIC BLOOD PRESSURE: 92 MMHG | BODY MASS INDEX: 31.15 KG/M2

## 2022-12-15 DIAGNOSIS — Z92.3 S/P RADIATION > 12 WEEKS: ICD-10-CM

## 2022-12-15 DIAGNOSIS — N61.0 MASTITIS: Primary | ICD-10-CM

## 2022-12-15 DIAGNOSIS — N64.89 RECURRENT SEROMA OF BREAST: ICD-10-CM

## 2022-12-15 PROCEDURE — 99212 OFFICE O/P EST SF 10 MIN: CPT | Performed by: SURGERY

## 2022-12-15 PROCEDURE — G8427 DOCREV CUR MEDS BY ELIG CLIN: HCPCS | Performed by: SURGERY

## 2022-12-15 PROCEDURE — 1123F ACP DISCUSS/DSCN MKR DOCD: CPT | Performed by: SURGERY

## 2022-12-15 PROCEDURE — G8417 CALC BMI ABV UP PARAM F/U: HCPCS | Performed by: SURGERY

## 2022-12-15 PROCEDURE — G8400 PT W/DXA NO RESULTS DOC: HCPCS | Performed by: SURGERY

## 2022-12-15 PROCEDURE — 1036F TOBACCO NON-USER: CPT | Performed by: SURGERY

## 2022-12-15 PROCEDURE — 1090F PRES/ABSN URINE INCON ASSESS: CPT | Performed by: SURGERY

## 2022-12-15 PROCEDURE — G8484 FLU IMMUNIZE NO ADMIN: HCPCS | Performed by: SURGERY

## 2022-12-15 ASSESSMENT — ENCOUNTER SYMPTOMS
DIARRHEA: 0
ABDOMINAL PAIN: 0
EYE PAIN: 0
SHORTNESS OF BREATH: 0
EYE DISCHARGE: 0
COUGH: 0
ABDOMINAL DISTENTION: 0
EYE ITCHING: 0
WHEEZING: 0
BACK PAIN: 0
SINUS PAIN: 0
NAUSEA: 0
SORE THROAT: 0
SINUS PRESSURE: 0

## 2023-01-20 ENCOUNTER — TELEPHONE (OUTPATIENT)
Dept: RADIATION ONCOLOGY | Age: 78
End: 2023-01-20

## 2023-01-26 ENCOUNTER — OFFICE VISIT (OUTPATIENT)
Dept: SURGERY | Age: 78
End: 2023-01-26
Payer: MEDICARE

## 2023-01-26 VITALS
DIASTOLIC BLOOD PRESSURE: 74 MMHG | HEART RATE: 125 BPM | BODY MASS INDEX: 32.08 KG/M2 | SYSTOLIC BLOOD PRESSURE: 122 MMHG | WEIGHT: 211 LBS

## 2023-01-26 DIAGNOSIS — N64.89 RECURRENT SEROMA OF BREAST: Primary | ICD-10-CM

## 2023-01-26 DIAGNOSIS — Z92.3 S/P RADIATION > 12 WEEKS: ICD-10-CM

## 2023-01-26 PROCEDURE — G8427 DOCREV CUR MEDS BY ELIG CLIN: HCPCS | Performed by: SURGERY

## 2023-01-26 PROCEDURE — 1090F PRES/ABSN URINE INCON ASSESS: CPT | Performed by: SURGERY

## 2023-01-26 PROCEDURE — 99213 OFFICE O/P EST LOW 20 MIN: CPT | Performed by: SURGERY

## 2023-01-26 PROCEDURE — G8400 PT W/DXA NO RESULTS DOC: HCPCS | Performed by: SURGERY

## 2023-01-26 PROCEDURE — 1123F ACP DISCUSS/DSCN MKR DOCD: CPT | Performed by: SURGERY

## 2023-01-26 PROCEDURE — 1036F TOBACCO NON-USER: CPT | Performed by: SURGERY

## 2023-01-26 PROCEDURE — G8417 CALC BMI ABV UP PARAM F/U: HCPCS | Performed by: SURGERY

## 2023-01-26 PROCEDURE — G8484 FLU IMMUNIZE NO ADMIN: HCPCS | Performed by: SURGERY

## 2023-01-26 ASSESSMENT — ENCOUNTER SYMPTOMS
SINUS PRESSURE: 0
NAUSEA: 0
BACK PAIN: 0
SINUS PAIN: 0
SHORTNESS OF BREATH: 0
VOMITING: 0
EYE DISCHARGE: 0
EYE PAIN: 0
COLOR CHANGE: 0
WHEEZING: 0
SORE THROAT: 0
COUGH: 0
EYE ITCHING: 0
ABDOMINAL PAIN: 0

## 2023-01-26 NOTE — PROGRESS NOTES
Charles Patrick MD, [facility], [address] [facility], [address]  Phone [phone]   Fax [phone]      Date of visit: 1/27/2023     Primary/Requesting provider: Contreras Cardona MD    Chief Complaint   Patient presents with    Follow-up     FU - Left breast seroma       Pt presents with questions/ongoing concerns regarding her left breast.  She believes she is doing better- seroma site healed. Review of Systems   Constitutional:  Negative for chills, fatigue and fever. HENT:  Negative for sinus pressure, sinus pain, sneezing and sore throat. Eyes:  Negative for pain, discharge and itching. Glasses for distance     Respiratory:  Negative for cough, shortness of breath and wheezing. Cardiovascular:  Negative for chest pain and palpitations. Gastrointestinal:  Negative for abdominal pain, nausea and vomiting. Endocrine: Negative for cold intolerance and heat intolerance. Genitourinary:  Negative for dyspareunia, dysuria and hematuria. Musculoskeletal:  Negative for back pain, joint swelling and myalgias. Skin:  Negative for color change, pallor and rash. Allergic/Immunologic: Negative for environmental allergies and food allergies. Neurological:  Negative for dizziness, light-headedness and headaches. Hematological:  Bruises/bleeds easily. Psychiatric/Behavioral:  Negative for confusion and sleep disturbance. The patient is not nervous/anxious. Physical Exam    Left breast with healed surgical site UOQ  Tissue is heterogeneously supple, not indurated. Central upper aspect and lateral aspect remain mildly red, minimal warmth    ASSESSMENT and PLAN:    1. Recurrent seroma of breast    2. S/P radiation > 12 weeks         20min discussion regarding her breast appearance. There is no clinical evidence of deep parenchymal infection- the parenchyma is heterogeneous, although it is somewhat denser than the right which is typical for post-XRT.   She is concerned about recurrent cancer- there is nothing on exam to suggest this  Ongoing redness may be from XRT- recommend OTC aloe-containing after-sun gels. She is reassured that there is NO EVIDENCE her cancer has recurred. Follow-up and Dispositions    Return for as needed. Follow-up and Dispositions    Return for as needed.

## 2023-03-10 DIAGNOSIS — C50.412 MALIGNANT NEOPLASM OF UPPER-OUTER QUADRANT OF LEFT BREAST IN FEMALE, ESTROGEN RECEPTOR POSITIVE (HCC): Primary | ICD-10-CM

## 2023-03-10 DIAGNOSIS — Z17.0 MALIGNANT NEOPLASM OF UPPER-OUTER QUADRANT OF LEFT BREAST IN FEMALE, ESTROGEN RECEPTOR POSITIVE (HCC): Primary | ICD-10-CM

## 2023-03-10 DIAGNOSIS — Z79.811 AROMATASE INHIBITOR USE: ICD-10-CM

## 2023-03-27 ENCOUNTER — TELEPHONE (OUTPATIENT)
Dept: ONCOLOGY | Age: 78
End: 2023-03-27

## 2023-03-27 DIAGNOSIS — Z79.811 AROMATASE INHIBITOR USE: Primary | ICD-10-CM

## 2023-03-27 DIAGNOSIS — Z12.31 SCREENING MAMMOGRAM FOR HIGH-RISK PATIENT: ICD-10-CM

## 2023-03-27 NOTE — TELEPHONE ENCOUNTER
I reviewed the chart and sent a msg to Dorian Morales in [facility]. Msg from [place] and we are to resume the Mammogram scheduling. Call to the patient to see if she has any problems. LVM. Msg to the NP Pool. Per EVER [facility] Or is in November. Order placed.

## 2023-03-27 NOTE — TELEPHONE ENCOUNTER
Pt called in regards to wanting to schedule her mammo. Pt had one scheduled in January that was canceled due to it being too soon. Please contact to discuss.

## 2023-03-28 NOTE — TELEPHONE ENCOUNTER
Call to the patient and LVM. Mammogram is in November and she can call to schedule it. If she is having issues she can call back. 96

## 2023-05-15 ENCOUNTER — TELEPHONE (OUTPATIENT)
Dept: ONCOLOGY | Age: 78
End: 2023-05-15

## 2023-05-19 ENCOUNTER — HOSPITAL ENCOUNTER (OUTPATIENT)
Dept: LAB | Age: 78
End: 2023-05-19
Payer: MEDICARE

## 2023-05-19 ENCOUNTER — OFFICE VISIT (OUTPATIENT)
Dept: ONCOLOGY | Age: 78
End: 2023-05-19
Payer: MEDICARE

## 2023-05-19 VITALS
TEMPERATURE: 97.8 F | OXYGEN SATURATION: 94 % | SYSTOLIC BLOOD PRESSURE: 156 MMHG | HEART RATE: 79 BPM | BODY MASS INDEX: 32.69 KG/M2 | WEIGHT: 215 LBS | DIASTOLIC BLOOD PRESSURE: 85 MMHG

## 2023-05-19 DIAGNOSIS — Z79.811 AROMATASE INHIBITOR USE: ICD-10-CM

## 2023-05-19 DIAGNOSIS — R23.2 HOT FLASHES RELATED TO AROMATASE INHIBITOR THERAPY: ICD-10-CM

## 2023-05-19 DIAGNOSIS — Z17.0 MALIGNANT NEOPLASM OF UPPER-OUTER QUADRANT OF LEFT BREAST IN FEMALE, ESTROGEN RECEPTOR POSITIVE (HCC): ICD-10-CM

## 2023-05-19 DIAGNOSIS — Z79.811 LONG TERM (CURRENT) USE OF AROMATASE INHIBITORS: ICD-10-CM

## 2023-05-19 DIAGNOSIS — C50.412 MALIGNANT NEOPLASM OF UPPER-OUTER QUADRANT OF LEFT BREAST IN FEMALE, ESTROGEN RECEPTOR POSITIVE (HCC): Primary | ICD-10-CM

## 2023-05-19 DIAGNOSIS — C50.412 MALIGNANT NEOPLASM OF UPPER-OUTER QUADRANT OF LEFT BREAST IN FEMALE, ESTROGEN RECEPTOR POSITIVE (HCC): ICD-10-CM

## 2023-05-19 DIAGNOSIS — T45.1X5A HOT FLASHES RELATED TO AROMATASE INHIBITOR THERAPY: ICD-10-CM

## 2023-05-19 DIAGNOSIS — Z17.0 MALIGNANT NEOPLASM OF UPPER-OUTER QUADRANT OF LEFT BREAST IN FEMALE, ESTROGEN RECEPTOR POSITIVE (HCC): Primary | ICD-10-CM

## 2023-05-19 LAB
ALBUMIN SERPL-MCNC: 3.5 G/DL (ref 3.2–4.6)
ALBUMIN/GLOB SERPL: 0.8 (ref 0.4–1.6)
ALP SERPL-CCNC: 77 U/L (ref 50–136)
ALT SERPL-CCNC: 44 U/L (ref 12–65)
ANION GAP SERPL CALC-SCNC: 4 MMOL/L (ref 2–11)
AST SERPL-CCNC: 53 U/L (ref 15–37)
BASOPHILS # BLD: 0.1 K/UL (ref 0–0.2)
BASOPHILS NFR BLD: 1 % (ref 0–2)
BILIRUB SERPL-MCNC: 0.5 MG/DL (ref 0.2–1.1)
BUN SERPL-MCNC: 19 MG/DL (ref 8–23)
CALCIUM SERPL-MCNC: 9.9 MG/DL (ref 8.3–10.4)
CHLORIDE SERPL-SCNC: 110 MMOL/L (ref 101–110)
CO2 SERPL-SCNC: 28 MMOL/L (ref 21–32)
CREAT SERPL-MCNC: 1.2 MG/DL (ref 0.6–1)
DIFFERENTIAL METHOD BLD: ABNORMAL
EOSINOPHIL # BLD: 0.3 K/UL (ref 0–0.8)
EOSINOPHIL NFR BLD: 4 % (ref 0.5–7.8)
ERYTHROCYTE [DISTWIDTH] IN BLOOD BY AUTOMATED COUNT: 13.5 % (ref 11.9–14.6)
GLOBULIN SER CALC-MCNC: 4.3 G/DL (ref 2.8–4.5)
GLUCOSE SERPL-MCNC: 94 MG/DL (ref 65–100)
HCT VFR BLD AUTO: 39.9 % (ref 35.8–46.3)
HGB BLD-MCNC: 13.2 G/DL (ref 11.7–15.4)
IMM GRANULOCYTES # BLD AUTO: 0 K/UL (ref 0–0.5)
IMM GRANULOCYTES NFR BLD AUTO: 0 % (ref 0–5)
LYMPHOCYTES # BLD: 1.6 K/UL (ref 0.5–4.6)
LYMPHOCYTES NFR BLD: 21 % (ref 13–44)
MCH RBC QN AUTO: 33.7 PG (ref 26.1–32.9)
MCHC RBC AUTO-ENTMCNC: 33.1 G/DL (ref 31.4–35)
MCV RBC AUTO: 101.8 FL (ref 82–102)
MONOCYTES # BLD: 0.8 K/UL (ref 0.1–1.3)
MONOCYTES NFR BLD: 10 % (ref 4–12)
NEUTS SEG # BLD: 4.9 K/UL (ref 1.7–8.2)
NEUTS SEG NFR BLD: 64 % (ref 43–78)
NRBC # BLD: 0 K/UL (ref 0–0.2)
PLATELET # BLD AUTO: 220 K/UL (ref 150–450)
PMV BLD AUTO: 9.1 FL (ref 9.4–12.3)
POTASSIUM SERPL-SCNC: 5.3 MMOL/L (ref 3.5–5.1)
PROT SERPL-MCNC: 7.8 G/DL (ref 6.3–8.2)
RBC # BLD AUTO: 3.92 M/UL (ref 4.05–5.2)
SODIUM SERPL-SCNC: 142 MMOL/L (ref 133–143)
WBC # BLD AUTO: 7.7 K/UL (ref 4.3–11.1)

## 2023-05-19 PROCEDURE — 1036F TOBACCO NON-USER: CPT | Performed by: NURSE PRACTITIONER

## 2023-05-19 PROCEDURE — 36415 COLL VENOUS BLD VENIPUNCTURE: CPT

## 2023-05-19 PROCEDURE — G8417 CALC BMI ABV UP PARAM F/U: HCPCS | Performed by: NURSE PRACTITIONER

## 2023-05-19 PROCEDURE — 99214 OFFICE O/P EST MOD 30 MIN: CPT | Performed by: NURSE PRACTITIONER

## 2023-05-19 PROCEDURE — G8427 DOCREV CUR MEDS BY ELIG CLIN: HCPCS | Performed by: NURSE PRACTITIONER

## 2023-05-19 PROCEDURE — 80053 COMPREHEN METABOLIC PANEL: CPT

## 2023-05-19 PROCEDURE — 1090F PRES/ABSN URINE INCON ASSESS: CPT | Performed by: NURSE PRACTITIONER

## 2023-05-19 PROCEDURE — G8400 PT W/DXA NO RESULTS DOC: HCPCS | Performed by: NURSE PRACTITIONER

## 2023-05-19 PROCEDURE — 85025 COMPLETE CBC W/AUTO DIFF WBC: CPT

## 2023-05-19 PROCEDURE — 1123F ACP DISCUSS/DSCN MKR DOCD: CPT | Performed by: NURSE PRACTITIONER

## 2023-05-19 RX ORDER — VENLAFAXINE HYDROCHLORIDE 75 MG/1
75 CAPSULE, EXTENDED RELEASE ORAL DAILY
Qty: 30 CAPSULE | Refills: 0 | Status: SHIPPED | OUTPATIENT
Start: 2023-05-19

## 2023-05-19 RX ORDER — VENLAFAXINE HYDROCHLORIDE 37.5 MG/1
37.5 CAPSULE, EXTENDED RELEASE ORAL DAILY
Qty: 7 CAPSULE | Refills: 0 | Status: SHIPPED | OUTPATIENT
Start: 2023-05-19 | End: 2023-05-26

## 2023-05-19 ASSESSMENT — PATIENT HEALTH QUESTIONNAIRE - PHQ9
SUM OF ALL RESPONSES TO PHQ QUESTIONS 1-9: 0
SUM OF ALL RESPONSES TO PHQ QUESTIONS 1-9: 0
2. FEELING DOWN, DEPRESSED OR HOPELESS: 0
1. LITTLE INTEREST OR PLEASURE IN DOING THINGS: 0
SUM OF ALL RESPONSES TO PHQ QUESTIONS 1-9: 0
SUM OF ALL RESPONSES TO PHQ9 QUESTIONS 1 & 2: 0
SUM OF ALL RESPONSES TO PHQ QUESTIONS 1-9: 0

## 2023-05-19 ASSESSMENT — ENCOUNTER SYMPTOMS
DIARRHEA: 0
VOICE CHANGE: 0
WHEEZING: 0
BLOOD IN STOOL: 0
TROUBLE SWALLOWING: 0
ABDOMINAL PAIN: 0
CHEST TIGHTNESS: 0
SCLERAL ICTERUS: 0
HEMOPTYSIS: 0
ABDOMINAL DISTENTION: 0
SHORTNESS OF BREATH: 0
CONSTIPATION: 0
NAUSEA: 0
SORE THROAT: 0
VOMITING: 0

## 2023-05-19 NOTE — PROGRESS NOTES
off celexa and start effexor.     - annual screening mammo due now, will schedule  - DEXA 5/2021 [facility] - care everywhere) - osteopenia;repeat now-will schedule   - Of note, ?memory issues, may be due to anxiety regarding her dx. Defer to PCP        RESUSCITATION DIRECTIVES/HOSPICE CARE: Full Support      RTC 6 mo or sooner as needed     MDM      Lab studies and imaging studies were personally reviewed. Pertinent old records were reviewed. Historical:    - we discussed the pathophysiology of breast cancer, staging, and the importance of receptor status in  terms of treatment options. We then reviewed her medical history as well as oncologic history, recent imaging and pathology in detail. We discussed next steps. We used visual aides to augment discussion. She will be referred to sx at this time. - she will be a candidate for endocrine therapy in the future due to her receptor status and we reviewed this today. We also discussed the role of OncotypeDx and its predictive/prognostic value. - here for FU after sx. Re-excision is planned for +margin/we discussed this today and she will also Fu w Dr Michael Rodriguez. MRI planned today and she will FU w sx after. We also discussed her OncotypeDx results - benefit of chemotherapy at <1%. We discussed next steps will be sx FU and then XRT consultation and then endocrine therapy. Role of endo tx reviewed in detail going over SE/MOA and duration of tx. I discussed pt's case with Dr Michael Rodriguez per her wishes after the visit today. - Will be starting endocrine therapy. Mechanism of action as well as side effects of the treatment discussed in detail with the patient. Asked for patient to check her blood pressure for a week prior to starting treatment and then once she starts treatment. Printed info given to pt for her reference. Next steps in surveillance reviewed.     - Ais can cause hot flashes, HTN, angina, swelling, fatigue, bone thinning leading to

## 2023-05-19 NOTE — PATIENT INSTRUCTIONS
K/UL Final    Absolute Immature Granulocyte 05/19/2023 0.0  0.0 - 0.5 K/UL Final    Differential Type 05/19/2023 AUTOMATED    Final         Treatment Summary has been discussed and given to patient: n/a        -------------------------------------------------------------------------------------------------------------------  Please call our office at [phone] if you have any  of the following symptoms:   Fever of 100.5 or greater  Chills  Shortness of breath  Swelling or pain in one leg    After office hours an answering service is available and will contact a provider for emergencies or if you are experiencing any of the above symptoms. Patient does express an interest in My Chart. My Chart log in information explained on the after visit summary printout at the . [address] desk.     Feliz Caicedo MA

## 2023-06-06 ENCOUNTER — HOSPITAL ENCOUNTER (OUTPATIENT)
Dept: MAMMOGRAPHY | Age: 78
Discharge: HOME | End: 2023-06-09
Payer: MEDICARE

## 2023-06-06 DIAGNOSIS — Z12.31 SCREENING MAMMOGRAM FOR HIGH-RISK PATIENT: ICD-10-CM

## 2023-06-06 DIAGNOSIS — Z79.811 AROMATASE INHIBITOR USE: ICD-10-CM

## 2023-06-06 PROCEDURE — 77080 DXA BONE DENSITY AXIAL: CPT

## 2023-06-08 ENCOUNTER — HOSPITAL ENCOUNTER (OUTPATIENT)
Dept: MAMMOGRAPHY | Age: 78
Discharge: HOME | End: 2023-06-08
Payer: MEDICARE

## 2023-06-08 DIAGNOSIS — Z17.0 MALIGNANT NEOPLASM OF LEFT BREAST IN FEMALE, ESTROGEN RECEPTOR POSITIVE, UNSPECIFIED SITE OF BREAST (HCC): ICD-10-CM

## 2023-06-08 DIAGNOSIS — C50.912 MALIGNANT NEOPLASM OF LEFT BREAST IN FEMALE, ESTROGEN RECEPTOR POSITIVE, UNSPECIFIED SITE OF BREAST (HCC): ICD-10-CM

## 2023-06-08 PROCEDURE — 76642 ULTRASOUND BREAST LIMITED: CPT

## 2023-06-21 RX ORDER — VENLAFAXINE HYDROCHLORIDE 75 MG/1
75 CAPSULE, EXTENDED RELEASE ORAL DAILY
Qty: 90 CAPSULE | Refills: 2 | Status: SHIPPED | OUTPATIENT
Start: 2023-06-21

## 2023-09-12 ENCOUNTER — TELEPHONE (OUTPATIENT)
Dept: ONCOLOGY | Age: 78
End: 2023-09-12

## 2023-09-12 DIAGNOSIS — Z17.0 MALIGNANT NEOPLASM OF UPPER-OUTER QUADRANT OF LEFT BREAST IN FEMALE, ESTROGEN RECEPTOR POSITIVE (HCC): Primary | ICD-10-CM

## 2023-09-12 DIAGNOSIS — C50.412 MALIGNANT NEOPLASM OF UPPER-OUTER QUADRANT OF LEFT BREAST IN FEMALE, ESTROGEN RECEPTOR POSITIVE (HCC): Primary | ICD-10-CM

## 2023-09-12 RX ORDER — LETROZOLE 2.5 MG/1
2.5 TABLET, FILM COATED ORAL DAILY
Qty: 10 TABLET | Refills: 0 | Status: SHIPPED | OUTPATIENT
Start: 2023-09-12 | End: 2023-09-22

## 2023-09-12 NOTE — TELEPHONE ENCOUNTER
Prescription sent with disp #10 to [facility] pharmacy as requested by [address] [place] [address]. Pharmacy notified.

## 2023-09-12 NOTE — TELEPHONE ENCOUNTER
Physician provider: Linda York MD  Reason for today's call: Medication refill  Last office visit: n/a    Patient notified that their information will be routed to the [facility] clinical triage team for review. Patient is advised that they will receive a phone call from the triage department. If symptoms worsen before receiving a call back, the patient has been advised to proceed to the nearest ED. [facility] called requesting a \"short fill Rx\" for: Letrozole 2.5 MG to be sent to: [facility] on [address] in [facility]. Per caller, Pt is completely out and they are unable to fill Rx for 7 days due to shortage.

## 2023-10-31 DIAGNOSIS — Z17.0 MALIGNANT NEOPLASM OF UPPER-OUTER QUADRANT OF LEFT BREAST IN FEMALE, ESTROGEN RECEPTOR POSITIVE (HCC): Primary | ICD-10-CM

## 2023-10-31 DIAGNOSIS — C50.412 MALIGNANT NEOPLASM OF UPPER-OUTER QUADRANT OF LEFT BREAST IN FEMALE, ESTROGEN RECEPTOR POSITIVE (HCC): Primary | ICD-10-CM

## 2023-10-31 ASSESSMENT — ENCOUNTER SYMPTOMS
VOMITING: 0
ABDOMINAL DISTENTION: 0
TROUBLE SWALLOWING: 0
CONSTIPATION: 0
DIARRHEA: 0
BLOOD IN STOOL: 0
VOICE CHANGE: 0
SHORTNESS OF BREATH: 0
SORE THROAT: 0
ABDOMINAL PAIN: 0
NAUSEA: 0
SCLERAL ICTERUS: 0
WHEEZING: 0
CHEST TIGHTNESS: 0
HEMOPTYSIS: 0

## 2023-10-31 NOTE — PROGRESS NOTES
Left lower leg: No edema. Lymphadenopathy:      Cervical: No cervical adenopathy. Upper Body:      Right upper body: No supraclavicular or axillary adenopathy. Left upper body: No supraclavicular or axillary adenopathy. Skin:     General: Skin is warm and dry. Coloration: Skin is not jaundiced or pale. Findings: No rash. Neurological:      General: No focal deficit present. Mental Status: She is alert and oriented to person, place, and time. Gait: Gait normal.   Psychiatric:         Behavior: Behavior normal.         Thought Content:  Thought content normal.        Labs:  Recent Results (from the past 168 hour(s))   CBC with Auto Differential    Collection Time: 11/03/23  8:47 AM   Result Value Ref Range    WBC 6.2 4.3 - 11.1 K/uL    RBC 3.58 (L) 4.05 - 5.2 M/uL    Hemoglobin 12.6 11.7 - 15.4 g/dL    Hematocrit 38.6 35.8 - 46.3 %    .8 (H) 82.0 - 102.0 FL    MCH 35.2 (H) 26.1 - 32.9 PG    MCHC 32.6 31.4 - 35.0 g/dL    RDW 14.4 11.9 - 14.6 %    Platelets 557 752 - 929 K/uL    MPV 8.9 (L) 9.4 - 12.3 FL    nRBC 0.00 0.0 - 0.2 K/uL    Differential Type AUTOMATED      Neutrophils % 60 43 - 78 %    Lymphocytes % 23 13 - 44 %    Monocytes % 12 4.0 - 12.0 %    Eosinophils % 4 0.5 - 7.8 %    Basophils % 1 0.0 - 2.0 %    Immature Granulocytes 0 0.0 - 5.0 %    Neutrophils Absolute 3.7 1.7 - 8.2 K/UL    Lymphocytes Absolute 1.5 0.5 - 4.6 K/UL    Monocytes Absolute 0.7 0.1 - 1.3 K/UL    Eosinophils Absolute 0.2 0.0 - 0.8 K/UL    Basophils Absolute 0.1 0.0 - 0.2 K/UL    Absolute Immature Granulocyte 0.0 0.0 - 0.5 K/UL   Comprehensive Metabolic Panel    Collection Time: 11/03/23  8:47 AM   Result Value Ref Range    Sodium 139 133 - 143 mmol/L    Potassium 4.4 3.5 - 5.1 mmol/L    Chloride 111 (H) 101 - 110 mmol/L    CO2 26 21 - 32 mmol/L    Anion Gap 2 2 - 11 mmol/L    Glucose 88 65 - 100 mg/dL    BUN 17 8 - 23 MG/DL    Creatinine 1.40 (H) 0.6 - 1.0 MG/DL    Est, Glom Filt Rate 39 (L) >60

## 2023-11-03 ENCOUNTER — OFFICE VISIT (OUTPATIENT)
Dept: ONCOLOGY | Age: 78
End: 2023-11-03
Payer: MEDICARE

## 2023-11-03 ENCOUNTER — HOSPITAL ENCOUNTER (OUTPATIENT)
Dept: GENERAL RADIOLOGY | Age: 78
Discharge: HOME | End: 2023-11-06

## 2023-11-03 ENCOUNTER — HOSPITAL ENCOUNTER (OUTPATIENT)
Dept: LAB | Age: 78
End: 2023-11-03
Payer: MEDICARE

## 2023-11-03 VITALS
OXYGEN SATURATION: 97 % | BODY MASS INDEX: 33.51 KG/M2 | SYSTOLIC BLOOD PRESSURE: 119 MMHG | HEIGHT: 67 IN | TEMPERATURE: 97.4 F | HEART RATE: 73 BPM | RESPIRATION RATE: 16 BRPM | WEIGHT: 213.5 LBS | DIASTOLIC BLOOD PRESSURE: 80 MMHG

## 2023-11-03 DIAGNOSIS — D75.89 MACROCYTOSIS: ICD-10-CM

## 2023-11-03 DIAGNOSIS — R07.81 RIB PAIN: ICD-10-CM

## 2023-11-03 DIAGNOSIS — Z17.0 MALIGNANT NEOPLASM OF UPPER-OUTER QUADRANT OF LEFT BREAST IN FEMALE, ESTROGEN RECEPTOR POSITIVE (HCC): Primary | ICD-10-CM

## 2023-11-03 DIAGNOSIS — T45.1X5A HOT FLASHES RELATED TO AROMATASE INHIBITOR THERAPY: ICD-10-CM

## 2023-11-03 DIAGNOSIS — C50.412 MALIGNANT NEOPLASM OF UPPER-OUTER QUADRANT OF LEFT BREAST IN FEMALE, ESTROGEN RECEPTOR POSITIVE (HCC): Primary | ICD-10-CM

## 2023-11-03 DIAGNOSIS — Z79.811 LONG TERM (CURRENT) USE OF AROMATASE INHIBITORS: ICD-10-CM

## 2023-11-03 DIAGNOSIS — Z79.811 AROMATASE INHIBITOR USE: ICD-10-CM

## 2023-11-03 DIAGNOSIS — R79.89 INCREASE IN CREATININE: ICD-10-CM

## 2023-11-03 DIAGNOSIS — N64.4 BREAST PAIN: ICD-10-CM

## 2023-11-03 DIAGNOSIS — R23.2 HOT FLASHES RELATED TO AROMATASE INHIBITOR THERAPY: ICD-10-CM

## 2023-11-03 DIAGNOSIS — C50.412 MALIGNANT NEOPLASM OF UPPER-OUTER QUADRANT OF LEFT BREAST IN FEMALE, ESTROGEN RECEPTOR POSITIVE (HCC): ICD-10-CM

## 2023-11-03 DIAGNOSIS — Z17.0 MALIGNANT NEOPLASM OF UPPER-OUTER QUADRANT OF LEFT BREAST IN FEMALE, ESTROGEN RECEPTOR POSITIVE (HCC): ICD-10-CM

## 2023-11-03 DIAGNOSIS — Z12.31 ENCOUNTER FOR SCREENING MAMMOGRAM FOR MALIGNANT NEOPLASM OF BREAST: ICD-10-CM

## 2023-11-03 LAB
ALBUMIN SERPL-MCNC: 3.6 G/DL (ref 3.2–4.6)
ALBUMIN/GLOB SERPL: 0.9 (ref 0.4–1.6)
ALP SERPL-CCNC: 86 U/L (ref 50–136)
ALT SERPL-CCNC: 39 U/L (ref 12–65)
ANION GAP SERPL CALC-SCNC: 2 MMOL/L (ref 2–11)
AST SERPL-CCNC: 44 U/L (ref 15–37)
BASOPHILS # BLD: 0.1 K/UL (ref 0–0.2)
BASOPHILS NFR BLD: 1 % (ref 0–2)
BILIRUB SERPL-MCNC: 0.5 MG/DL (ref 0.2–1.1)
BUN SERPL-MCNC: 17 MG/DL (ref 8–23)
CALCIUM SERPL-MCNC: 9.6 MG/DL (ref 8.3–10.4)
CHLORIDE SERPL-SCNC: 111 MMOL/L (ref 101–110)
CO2 SERPL-SCNC: 26 MMOL/L (ref 21–32)
CREAT SERPL-MCNC: 1.4 MG/DL (ref 0.6–1)
DIFFERENTIAL METHOD BLD: ABNORMAL
EOSINOPHIL # BLD: 0.2 K/UL (ref 0–0.8)
EOSINOPHIL NFR BLD: 4 % (ref 0.5–7.8)
ERYTHROCYTE [DISTWIDTH] IN BLOOD BY AUTOMATED COUNT: 14.4 % (ref 11.9–14.6)
FOLATE SERPL-MCNC: 8.2 NG/ML (ref 3.1–17.5)
GLOBULIN SER CALC-MCNC: 4 G/DL (ref 2.8–4.5)
GLUCOSE SERPL-MCNC: 88 MG/DL (ref 65–100)
HCT VFR BLD AUTO: 38.6 % (ref 35.8–46.3)
HGB BLD-MCNC: 12.6 G/DL (ref 11.7–15.4)
IMM GRANULOCYTES # BLD AUTO: 0 K/UL (ref 0–0.5)
IMM GRANULOCYTES NFR BLD AUTO: 0 % (ref 0–5)
LYMPHOCYTES # BLD: 1.5 K/UL (ref 0.5–4.6)
LYMPHOCYTES NFR BLD: 23 % (ref 13–44)
MCH RBC QN AUTO: 35.2 PG (ref 26.1–32.9)
MCHC RBC AUTO-ENTMCNC: 32.6 G/DL (ref 31.4–35)
MCV RBC AUTO: 107.8 FL (ref 82–102)
MONOCYTES # BLD: 0.7 K/UL (ref 0.1–1.3)
MONOCYTES NFR BLD: 12 % (ref 4–12)
NEUTS SEG # BLD: 3.7 K/UL (ref 1.7–8.2)
NEUTS SEG NFR BLD: 60 % (ref 43–78)
NRBC # BLD: 0 K/UL (ref 0–0.2)
PLATELET # BLD AUTO: 243 K/UL (ref 150–450)
PMV BLD AUTO: 8.9 FL (ref 9.4–12.3)
POTASSIUM SERPL-SCNC: 4.4 MMOL/L (ref 3.5–5.1)
PROT SERPL-MCNC: 7.6 G/DL (ref 6.3–8.2)
RBC # BLD AUTO: 3.58 M/UL (ref 4.05–5.2)
SODIUM SERPL-SCNC: 139 MMOL/L (ref 133–143)
VIT B12 SERPL-MCNC: 414 PG/ML (ref 193–986)
WBC # BLD AUTO: 6.2 K/UL (ref 4.3–11.1)

## 2023-11-03 PROCEDURE — G8417 CALC BMI ABV UP PARAM F/U: HCPCS | Performed by: INTERNAL MEDICINE

## 2023-11-03 PROCEDURE — G8484 FLU IMMUNIZE NO ADMIN: HCPCS | Performed by: INTERNAL MEDICINE

## 2023-11-03 PROCEDURE — 1090F PRES/ABSN URINE INCON ASSESS: CPT | Performed by: INTERNAL MEDICINE

## 2023-11-03 PROCEDURE — 82607 VITAMIN B-12: CPT

## 2023-11-03 PROCEDURE — 99215 OFFICE O/P EST HI 40 MIN: CPT | Performed by: INTERNAL MEDICINE

## 2023-11-03 PROCEDURE — 36415 COLL VENOUS BLD VENIPUNCTURE: CPT

## 2023-11-03 PROCEDURE — 82746 ASSAY OF FOLIC ACID SERUM: CPT

## 2023-11-03 PROCEDURE — 1123F ACP DISCUSS/DSCN MKR DOCD: CPT | Performed by: INTERNAL MEDICINE

## 2023-11-03 PROCEDURE — 1036F TOBACCO NON-USER: CPT | Performed by: INTERNAL MEDICINE

## 2023-11-03 PROCEDURE — G8399 PT W/DXA RESULTS DOCUMENT: HCPCS | Performed by: INTERNAL MEDICINE

## 2023-11-03 PROCEDURE — 80053 COMPREHEN METABOLIC PANEL: CPT

## 2023-11-03 PROCEDURE — G8427 DOCREV CUR MEDS BY ELIG CLIN: HCPCS | Performed by: INTERNAL MEDICINE

## 2023-11-03 PROCEDURE — 85025 COMPLETE CBC W/AUTO DIFF WBC: CPT

## 2023-11-03 RX ORDER — ANASTROZOLE 1 MG/1
1 TABLET ORAL DAILY
Qty: 30 TABLET | Refills: 3 | Status: SHIPPED | OUTPATIENT
Start: 2023-11-03

## 2023-11-03 RX ORDER — VENLAFAXINE HYDROCHLORIDE 37.5 MG/1
37.5 CAPSULE, EXTENDED RELEASE ORAL DAILY
Qty: 30 CAPSULE | Refills: 0 | Status: SHIPPED | OUTPATIENT
Start: 2023-11-03

## 2023-11-03 ASSESSMENT — PATIENT HEALTH QUESTIONNAIRE - PHQ9
SUM OF ALL RESPONSES TO PHQ QUESTIONS 1-9: 1
2. FEELING DOWN, DEPRESSED OR HOPELESS: 0
SUM OF ALL RESPONSES TO PHQ QUESTIONS 1-9: 1
SUM OF ALL RESPONSES TO PHQ9 QUESTIONS 1 & 2: 1
SUM OF ALL RESPONSES TO PHQ QUESTIONS 1-9: 1
SUM OF ALL RESPONSES TO PHQ QUESTIONS 1-9: 1
1. LITTLE INTEREST OR PLEASURE IN DOING THINGS: 1

## 2023-11-03 NOTE — PATIENT INSTRUCTIONS
0.4 - 1.6   Final         Treatment Summary has been discussed and given to patient: n/a        -------------------------------------------------------------------------------------------------------------------  Please call our office at [phone] if you have any  of the following symptoms:   Fever of 100.5 or greater  Chills  Shortness of breath  Swelling or pain in one leg    After office hours an answering service is available and will contact a provider for emergencies or if you are experiencing any of the above symptoms. Patient did express an interest in My Chart. My Chart log in information explained on the after visit summary printout at the [address] desk.     Rd Betts RN

## 2023-11-06 LAB — PATH REV BLD -IMP: NORMAL

## 2023-11-07 PROBLEM — R79.89 INCREASE IN CREATININE: Status: ACTIVE | Noted: 2023-11-07

## 2023-11-07 PROBLEM — D75.89 MACROCYTOSIS: Status: ACTIVE | Noted: 2023-11-07

## 2023-11-07 PROBLEM — R23.2 HOT FLASHES RELATED TO AROMATASE INHIBITOR THERAPY: Status: ACTIVE | Noted: 2023-11-07

## 2023-11-07 PROBLEM — Z12.31 ENCOUNTER FOR SCREENING MAMMOGRAM FOR MALIGNANT NEOPLASM OF BREAST: Status: ACTIVE | Noted: 2023-11-07

## 2023-11-07 PROBLEM — Z79.811 LONG TERM (CURRENT) USE OF AROMATASE INHIBITORS: Status: ACTIVE | Noted: 2023-11-07

## 2023-11-07 PROBLEM — N64.4 BREAST PAIN: Status: ACTIVE | Noted: 2023-11-07

## 2023-11-07 PROBLEM — T45.1X5A HOT FLASHES RELATED TO AROMATASE INHIBITOR THERAPY: Status: ACTIVE | Noted: 2023-11-07

## 2023-11-07 PROBLEM — R07.81 RIB PAIN: Status: ACTIVE | Noted: 2023-11-07

## 2023-11-16 RX ORDER — LETROZOLE 2.5 MG/1
TABLET, FILM COATED ORAL
Qty: 30 TABLET | Refills: 0 | OUTPATIENT
Start: 2023-11-16

## 2023-11-22 ENCOUNTER — HOSPITAL ENCOUNTER (OUTPATIENT)
Dept: MAMMOGRAPHY | Age: 78
Discharge: HOME | End: 2023-11-25
Attending: INTERNAL MEDICINE
Payer: MEDICARE

## 2023-11-22 VITALS — HEIGHT: 67 IN | BODY MASS INDEX: 33.74 KG/M2 | WEIGHT: 215 LBS

## 2023-11-22 DIAGNOSIS — N64.4 BREAST PAIN: ICD-10-CM

## 2023-11-22 DIAGNOSIS — C50.412 MALIGNANT NEOPLASM OF UPPER-OUTER QUADRANT OF LEFT BREAST IN FEMALE, ESTROGEN RECEPTOR POSITIVE (HCC): ICD-10-CM

## 2023-11-22 DIAGNOSIS — Z12.31 ENCOUNTER FOR SCREENING MAMMOGRAM FOR MALIGNANT NEOPLASM OF BREAST: ICD-10-CM

## 2023-11-22 DIAGNOSIS — Z17.0 MALIGNANT NEOPLASM OF UPPER-OUTER QUADRANT OF LEFT BREAST IN FEMALE, ESTROGEN RECEPTOR POSITIVE (HCC): ICD-10-CM

## 2023-11-22 PROCEDURE — 77063 BREAST TOMOSYNTHESIS BI: CPT

## 2023-12-04 ENCOUNTER — TELEPHONE (OUTPATIENT)
Dept: ONCOLOGY | Age: 78
End: 2023-12-04

## 2023-12-04 NOTE — TELEPHONE ENCOUNTER
Patient informed that per dictation, she is to stay on Anastrozole. Informed current prescription has refills. Verbalized understanding.

## 2023-12-04 NOTE — TELEPHONE ENCOUNTER
Physician provider: Jaylene Stark MD  Reason for today's call: medication  Last office visit:11/03/23    Patient notified that their information will be routed to the [facility] clinical triage team for review. Patient is advised that they will receive a phone call from the triage department. If symptoms worsen before receiving a call back, the patient has been advised to proceed to the nearest ED. Pt want to verify if  Dr. Azalia Galarza want's her to stay on Anastrozole then send RX to  [address] [facility] for a 3 month supply.       [facility]: [phone] [address] [facility]

## 2023-12-07 PROBLEM — Z12.31 ENCOUNTER FOR SCREENING MAMMOGRAM FOR MALIGNANT NEOPLASM OF BREAST: Status: RESOLVED | Noted: 2023-11-07 | Resolved: 2023-12-07

## 2024-02-14 ENCOUNTER — APPOINTMENT (RX ONLY)
Dept: URBAN - NONMETROPOLITAN AREA CLINIC 1 | Facility: CLINIC | Age: 79
Setting detail: DERMATOLOGY
End: 2024-02-14

## 2024-02-14 DIAGNOSIS — L30.9 DERMATITIS, UNSPECIFIED: ICD-10-CM | Status: INADEQUATELY CONTROLLED

## 2024-02-14 PROCEDURE — ? COUNSELING

## 2024-02-14 PROCEDURE — ? TREATMENT REGIMEN

## 2024-02-14 PROCEDURE — 99203 OFFICE O/P NEW LOW 30 MIN: CPT

## 2024-02-14 PROCEDURE — ? PHOTO-DOCUMENTATION

## 2024-02-14 ASSESSMENT — LOCATION DETAILED DESCRIPTION DERM: LOCATION DETAILED: RIGHT PROXIMAL LATERAL PRETIBIAL REGION

## 2024-02-14 ASSESSMENT — LOCATION ZONE DERM: LOCATION ZONE: LEG

## 2024-02-14 ASSESSMENT — LOCATION SIMPLE DESCRIPTION DERM: LOCATION SIMPLE: RIGHT LOWER LEG

## 2024-02-14 NOTE — PROCEDURE: TREATMENT REGIMEN
Samples Given: Aquaphor
Detail Level: Detailed
Plan: Will continue monitoring for any changes.\\nAppt moisturizer several times daily.

## 2024-03-11 DIAGNOSIS — Z79.811 AROMATASE INHIBITOR USE: ICD-10-CM

## 2024-03-11 DIAGNOSIS — Z17.0 MALIGNANT NEOPLASM OF UPPER-OUTER QUADRANT OF LEFT BREAST IN FEMALE, ESTROGEN RECEPTOR POSITIVE (HCC): ICD-10-CM

## 2024-03-11 DIAGNOSIS — C50.412 MALIGNANT NEOPLASM OF UPPER-OUTER QUADRANT OF LEFT BREAST IN FEMALE, ESTROGEN RECEPTOR POSITIVE (HCC): ICD-10-CM

## 2024-03-11 RX ORDER — ANASTROZOLE 1 MG/1
1 TABLET ORAL DAILY
Qty: 90 TABLET | Refills: 3 | Status: SHIPPED | OUTPATIENT
Start: 2024-03-11

## 2024-04-24 DIAGNOSIS — Z17.0 MALIGNANT NEOPLASM OF UPPER-OUTER QUADRANT OF LEFT BREAST IN FEMALE, ESTROGEN RECEPTOR POSITIVE (HCC): Primary | ICD-10-CM

## 2024-04-24 DIAGNOSIS — C50.412 MALIGNANT NEOPLASM OF UPPER-OUTER QUADRANT OF LEFT BREAST IN FEMALE, ESTROGEN RECEPTOR POSITIVE (HCC): Primary | ICD-10-CM

## 2024-04-24 NOTE — PROGRESS NOTES
medical history as well as oncologic history, recent imaging and pathology in detail.    S/p Re-excision for +margin/w Dr Dewitt - path neg for malignancy.    We discussed her OncotypeDx results - benefit of chemotherapy at <1%.  Role of endo tx reviewed in detail going over SE/MOA and duration of tx.    Of note, ?memory issues, may be due to anxiety regarding her dx.    - follow up on letrozole.  Overall, she is tolerating it well.  There are no GI or bowel complaints. Appetite is good and weight is stable.  Mild fatigue is ongoing.  There is no shortness of breath or edema.  No changes from her baseline arthralgias.  Her biggest complaint is worsening hot flashes/night sweats.  Since last seen she had a left breast seroma that was drained twice and has not been an issue in several months.  She does note a mildly tender and firmness running under her left breast that has been present for months and unchanged.          - here for FU.  Stated she's having a hard time with sweating on medications - letrozole and venlafaxine.  She did not think these were hot flashes but rather excessive sweating.  Diaphoresis is a common side effect of venlafaxine and taper recommended.  Discussed role of other Ais and Tellez.  We reviewed her DEXA - has early osteopeia of hips.  Wished to c/w vit D.  She reported pain under left breast - in XRT field.  Advised to wear looser bra and will get Xray ribs today - could be related to XRT/costochondritis - she can try voltaren gel.  Cr at 1.4 - she is to increse hydration.  Due for mammogram in Nov - bilateral diagn.  Macrocytosis - will check smear, B12/flate.      Today, patient is here for follow-up.  She continues on anastrozole.  She did wean off venlafaxine with improvement in the sweating.  She does not want to consider Veozah at this time but would consider if  develops bothersome hot flashes.  She will try to see if timing of taking AI changes her SE.  Labs reviewed and reassuring.

## 2024-04-26 ENCOUNTER — OFFICE VISIT (OUTPATIENT)
Dept: ONCOLOGY | Age: 79
End: 2024-04-26
Payer: MEDICARE

## 2024-04-26 ENCOUNTER — HOSPITAL ENCOUNTER (OUTPATIENT)
Dept: LAB | Age: 79
End: 2024-04-26
Payer: MEDICARE

## 2024-04-26 VITALS
TEMPERATURE: 98.3 F | BODY MASS INDEX: 31.81 KG/M2 | HEIGHT: 67 IN | DIASTOLIC BLOOD PRESSURE: 80 MMHG | SYSTOLIC BLOOD PRESSURE: 144 MMHG | HEART RATE: 82 BPM | WEIGHT: 202.7 LBS | RESPIRATION RATE: 18 BRPM | OXYGEN SATURATION: 96 %

## 2024-04-26 DIAGNOSIS — C50.412 MALIGNANT NEOPLASM OF UPPER-OUTER QUADRANT OF LEFT BREAST IN FEMALE, ESTROGEN RECEPTOR POSITIVE (HCC): ICD-10-CM

## 2024-04-26 DIAGNOSIS — Z12.39 BREAST CANCER SCREENING, HIGH RISK PATIENT: ICD-10-CM

## 2024-04-26 DIAGNOSIS — C50.412 MALIGNANT NEOPLASM OF UPPER-OUTER QUADRANT OF LEFT BREAST IN FEMALE, ESTROGEN RECEPTOR POSITIVE (HCC): Primary | ICD-10-CM

## 2024-04-26 DIAGNOSIS — Z79.811 AROMATASE INHIBITOR USE: ICD-10-CM

## 2024-04-26 DIAGNOSIS — Z17.0 MALIGNANT NEOPLASM OF UPPER-OUTER QUADRANT OF LEFT BREAST IN FEMALE, ESTROGEN RECEPTOR POSITIVE (HCC): ICD-10-CM

## 2024-04-26 DIAGNOSIS — Z17.0 MALIGNANT NEOPLASM OF UPPER-OUTER QUADRANT OF LEFT BREAST IN FEMALE, ESTROGEN RECEPTOR POSITIVE (HCC): Primary | ICD-10-CM

## 2024-04-26 LAB
ALBUMIN SERPL-MCNC: 3.8 G/DL (ref 3.2–4.6)
ALBUMIN/GLOB SERPL: 1 (ref 1–1.9)
ALP SERPL-CCNC: 79 U/L (ref 35–104)
ALT SERPL-CCNC: 21 U/L (ref 12–65)
ANION GAP SERPL CALC-SCNC: 9 MMOL/L (ref 9–18)
AST SERPL-CCNC: 37 U/L (ref 15–37)
BASOPHILS # BLD: 0 K/UL (ref 0–0.2)
BASOPHILS NFR BLD: 1 % (ref 0–2)
BILIRUB SERPL-MCNC: 0.5 MG/DL (ref 0–1.2)
BUN SERPL-MCNC: 8 MG/DL (ref 8–23)
CALCIUM SERPL-MCNC: 9.9 MG/DL (ref 8.8–10.2)
CHLORIDE SERPL-SCNC: 107 MMOL/L (ref 98–107)
CO2 SERPL-SCNC: 24 MMOL/L (ref 20–28)
CREAT SERPL-MCNC: 0.91 MG/DL (ref 0.6–1.1)
DIFFERENTIAL METHOD BLD: ABNORMAL
EOSINOPHIL # BLD: 0.1 K/UL (ref 0–0.8)
EOSINOPHIL NFR BLD: 2 % (ref 0.5–7.8)
ERYTHROCYTE [DISTWIDTH] IN BLOOD BY AUTOMATED COUNT: 12.7 % (ref 11.9–14.6)
GLOBULIN SER CALC-MCNC: 3.7 G/DL (ref 2.3–3.5)
GLUCOSE SERPL-MCNC: 104 MG/DL (ref 70–99)
HCT VFR BLD AUTO: 42.1 % (ref 35.8–46.3)
HGB BLD-MCNC: 14.1 G/DL (ref 11.7–15.4)
IMM GRANULOCYTES # BLD AUTO: 0 K/UL (ref 0–0.5)
IMM GRANULOCYTES NFR BLD AUTO: 0 % (ref 0–5)
LYMPHOCYTES # BLD: 1.7 K/UL (ref 0.5–4.6)
LYMPHOCYTES NFR BLD: 28 % (ref 13–44)
MCH RBC QN AUTO: 33.7 PG (ref 26.1–32.9)
MCHC RBC AUTO-ENTMCNC: 33.5 G/DL (ref 31.4–35)
MCV RBC AUTO: 100.5 FL (ref 82–102)
MONOCYTES # BLD: 0.6 K/UL (ref 0.1–1.3)
MONOCYTES NFR BLD: 9 % (ref 4–12)
NEUTS SEG # BLD: 3.5 K/UL (ref 1.7–8.2)
NEUTS SEG NFR BLD: 60 % (ref 43–78)
NRBC # BLD: 0 K/UL (ref 0–0.2)
PLATELET # BLD AUTO: 219 K/UL (ref 150–450)
PMV BLD AUTO: 9.1 FL (ref 9.4–12.3)
POTASSIUM SERPL-SCNC: 4.7 MMOL/L (ref 3.5–5.1)
PROT SERPL-MCNC: 7.5 G/DL (ref 6.3–8.2)
RBC # BLD AUTO: 4.19 M/UL (ref 4.05–5.2)
SODIUM SERPL-SCNC: 140 MMOL/L (ref 136–145)
WBC # BLD AUTO: 5.9 K/UL (ref 4.3–11.1)

## 2024-04-26 PROCEDURE — G8427 DOCREV CUR MEDS BY ELIG CLIN: HCPCS | Performed by: INTERNAL MEDICINE

## 2024-04-26 PROCEDURE — 1123F ACP DISCUSS/DSCN MKR DOCD: CPT | Performed by: INTERNAL MEDICINE

## 2024-04-26 PROCEDURE — 80053 COMPREHEN METABOLIC PANEL: CPT

## 2024-04-26 PROCEDURE — 1036F TOBACCO NON-USER: CPT | Performed by: INTERNAL MEDICINE

## 2024-04-26 PROCEDURE — 36415 COLL VENOUS BLD VENIPUNCTURE: CPT

## 2024-04-26 PROCEDURE — G8417 CALC BMI ABV UP PARAM F/U: HCPCS | Performed by: INTERNAL MEDICINE

## 2024-04-26 PROCEDURE — 85025 COMPLETE CBC W/AUTO DIFF WBC: CPT

## 2024-04-26 PROCEDURE — G8399 PT W/DXA RESULTS DOCUMENT: HCPCS | Performed by: INTERNAL MEDICINE

## 2024-04-26 PROCEDURE — 99214 OFFICE O/P EST MOD 30 MIN: CPT | Performed by: INTERNAL MEDICINE

## 2024-04-26 PROCEDURE — 1090F PRES/ABSN URINE INCON ASSESS: CPT | Performed by: INTERNAL MEDICINE

## 2024-04-26 ASSESSMENT — PATIENT HEALTH QUESTIONNAIRE - PHQ9
SUM OF ALL RESPONSES TO PHQ QUESTIONS 1-9: 0
2. FEELING DOWN, DEPRESSED OR HOPELESS: NOT AT ALL
SUM OF ALL RESPONSES TO PHQ9 QUESTIONS 1 & 2: 0
1. LITTLE INTEREST OR PLEASURE IN DOING THINGS: NOT AT ALL

## 2024-04-26 NOTE — PATIENT INSTRUCTIONS
Patient Information from Today's Visit    Labs reviewed.  Symptoms reviewed.  Hold anastrozole as discussed prior to surgery.  MRI of breasts recommended next month.  You can call to schedule this at [phone].    Treatment Summary has been discussed and given to patient:N/A    Follow Up: About 3-4 months.    Please refer to After Visit Summary or MyChart for upcoming appointment information. If you have any questions regarding your upcoming schedule please reach out to your care team through [facility] or call [phone].      -------------------------------------------------------------------------------------------------------------------  Please call our office at [phone] if you have any  of the following symptoms:   Fever of 100.5 or greater  Chills  Shortness of breath  Swelling or pain in one leg    After office hours an answering service is available and will contact a provider for emergencies or if you are experiencing any of the above symptoms.    Patient did express an interest in My Chart.  My Chart log in information explained on the after visit summary printout at the check-out desk.    SHIRIN HUSTON RN      Your Oncology Care Team:  Cris Brand MD - Hematologist/Oncologist  ALEXANDRE Johnson-CNP - Nurse Practitioner  IKE Patel RN - Registered Nurse  Анна Colin - Medical Assistant  Gwen Wild -

## 2024-05-01 PROBLEM — Z12.39 BREAST CANCER SCREENING, HIGH RISK PATIENT: Status: ACTIVE | Noted: 2024-05-01

## 2024-05-20 ENCOUNTER — HOSPITAL ENCOUNTER (OUTPATIENT)
Dept: MRI IMAGING | Age: 79
Discharge: HOME | End: 2024-05-23
Attending: INTERNAL MEDICINE
Payer: MEDICARE

## 2024-05-20 DIAGNOSIS — C50.412 MALIGNANT NEOPLASM OF UPPER-OUTER QUADRANT OF LEFT BREAST IN FEMALE, ESTROGEN RECEPTOR POSITIVE (HCC): ICD-10-CM

## 2024-05-20 DIAGNOSIS — Z17.0 MALIGNANT NEOPLASM OF UPPER-OUTER QUADRANT OF LEFT BREAST IN FEMALE, ESTROGEN RECEPTOR POSITIVE (HCC): ICD-10-CM

## 2024-05-20 DIAGNOSIS — Z12.39 BREAST CANCER SCREENING, HIGH RISK PATIENT: ICD-10-CM

## 2024-05-20 PROCEDURE — C8908 MRI W/O FOL W/CONT, BREAST,: HCPCS

## 2024-05-20 PROCEDURE — 6360000004 HC RX CONTRAST MEDICATION: Performed by: INTERNAL MEDICINE

## 2024-05-20 PROCEDURE — A9579 GAD-BASE MR CONTRAST NOS,1ML: HCPCS | Performed by: INTERNAL MEDICINE

## 2024-05-20 RX ADMIN — GADOTERIDOL 19 ML: 279.3 INJECTION, SOLUTION INTRAVENOUS at 19:13

## 2024-05-31 PROBLEM — Z12.39 BREAST CANCER SCREENING, HIGH RISK PATIENT: Status: RESOLVED | Noted: 2024-05-01 | Resolved: 2024-05-31

## 2024-06-04 DIAGNOSIS — Z17.0 MALIGNANT NEOPLASM OF UPPER-OUTER QUADRANT OF LEFT BREAST IN FEMALE, ESTROGEN RECEPTOR POSITIVE (HCC): ICD-10-CM

## 2024-06-04 DIAGNOSIS — C50.412 MALIGNANT NEOPLASM OF UPPER-OUTER QUADRANT OF LEFT BREAST IN FEMALE, ESTROGEN RECEPTOR POSITIVE (HCC): ICD-10-CM

## 2024-06-04 DIAGNOSIS — Z79.811 AROMATASE INHIBITOR USE: ICD-10-CM

## 2024-06-06 RX ORDER — ANASTROZOLE 1 MG/1
TABLET ORAL
Qty: 90 TABLET | Refills: 0 | OUTPATIENT
Start: 2024-06-06

## 2024-08-29 ENCOUNTER — OFFICE VISIT (OUTPATIENT)
Dept: ONCOLOGY | Age: 79
End: 2024-08-29
Payer: MEDICARE

## 2024-08-29 ENCOUNTER — HOSPITAL ENCOUNTER (OUTPATIENT)
Dept: LAB | Age: 79
Discharge: HOME | End: 2024-08-29
Payer: MEDICARE

## 2024-08-29 VITALS
HEIGHT: 67 IN | OXYGEN SATURATION: 99 % | BODY MASS INDEX: 30.45 KG/M2 | DIASTOLIC BLOOD PRESSURE: 78 MMHG | RESPIRATION RATE: 18 BRPM | SYSTOLIC BLOOD PRESSURE: 118 MMHG | HEART RATE: 82 BPM | WEIGHT: 194 LBS | TEMPERATURE: 97.9 F

## 2024-08-29 DIAGNOSIS — C50.412 MALIGNANT NEOPLASM OF UPPER-OUTER QUADRANT OF LEFT BREAST IN FEMALE, ESTROGEN RECEPTOR POSITIVE (HCC): Primary | ICD-10-CM

## 2024-08-29 DIAGNOSIS — Z17.0 MALIGNANT NEOPLASM OF UPPER-OUTER QUADRANT OF LEFT BREAST IN FEMALE, ESTROGEN RECEPTOR POSITIVE (HCC): Primary | ICD-10-CM

## 2024-08-29 DIAGNOSIS — Z17.0 MALIGNANT NEOPLASM OF UPPER-OUTER QUADRANT OF LEFT BREAST IN FEMALE, ESTROGEN RECEPTOR POSITIVE (HCC): ICD-10-CM

## 2024-08-29 DIAGNOSIS — Z12.39 BREAST CANCER SCREENING, HIGH RISK PATIENT: ICD-10-CM

## 2024-08-29 DIAGNOSIS — R06.09 DOE (DYSPNEA ON EXERTION): ICD-10-CM

## 2024-08-29 DIAGNOSIS — C50.412 MALIGNANT NEOPLASM OF UPPER-OUTER QUADRANT OF LEFT BREAST IN FEMALE, ESTROGEN RECEPTOR POSITIVE (HCC): ICD-10-CM

## 2024-08-29 DIAGNOSIS — R00.0 TACHYCARDIA: ICD-10-CM

## 2024-08-29 DIAGNOSIS — Z79.811 AROMATASE INHIBITOR USE: ICD-10-CM

## 2024-08-29 DIAGNOSIS — Z12.31 ENCOUNTER FOR SCREENING MAMMOGRAM FOR MALIGNANT NEOPLASM OF BREAST: ICD-10-CM

## 2024-08-29 LAB
ALBUMIN SERPL-MCNC: 4.1 G/DL (ref 3.2–4.6)
ALBUMIN/GLOB SERPL: 1.2 (ref 1–1.9)
ALP SERPL-CCNC: 97 U/L (ref 35–104)
ALT SERPL-CCNC: 40 U/L (ref 12–65)
ANION GAP SERPL CALC-SCNC: 10 MMOL/L (ref 9–18)
AST SERPL-CCNC: 49 U/L (ref 15–37)
BASOPHILS # BLD: 0.1 K/UL (ref 0–0.2)
BASOPHILS NFR BLD: 1 % (ref 0–2)
BILIRUB SERPL-MCNC: 0.5 MG/DL (ref 0–1.2)
BUN SERPL-MCNC: 14 MG/DL (ref 8–23)
CALCIUM SERPL-MCNC: 10.6 MG/DL (ref 8.8–10.2)
CHLORIDE SERPL-SCNC: 103 MMOL/L (ref 98–107)
CO2 SERPL-SCNC: 25 MMOL/L (ref 20–28)
CREAT SERPL-MCNC: 0.86 MG/DL (ref 0.6–1.1)
DIFFERENTIAL METHOD BLD: ABNORMAL
EOSINOPHIL # BLD: 0.1 K/UL (ref 0–0.8)
EOSINOPHIL NFR BLD: 2 % (ref 0.5–7.8)
ERYTHROCYTE [DISTWIDTH] IN BLOOD BY AUTOMATED COUNT: 13.7 % (ref 11.9–14.6)
GLOBULIN SER CALC-MCNC: 3.4 G/DL (ref 2.3–3.5)
GLUCOSE SERPL-MCNC: 93 MG/DL (ref 70–99)
HCT VFR BLD AUTO: 41.5 % (ref 35.8–46.3)
HGB BLD-MCNC: 13.9 G/DL (ref 11.7–15.4)
IMM GRANULOCYTES # BLD AUTO: 0 K/UL (ref 0–0.5)
IMM GRANULOCYTES NFR BLD AUTO: 0 % (ref 0–5)
LYMPHOCYTES # BLD: 1.4 K/UL (ref 0.5–4.6)
LYMPHOCYTES NFR BLD: 23 % (ref 13–44)
MCH RBC QN AUTO: 32.9 PG (ref 26.1–32.9)
MCHC RBC AUTO-ENTMCNC: 33.5 G/DL (ref 31.4–35)
MCV RBC AUTO: 98.1 FL (ref 82–102)
MONOCYTES # BLD: 0.6 K/UL (ref 0.1–1.3)
MONOCYTES NFR BLD: 11 % (ref 4–12)
NEUTS SEG # BLD: 3.9 K/UL (ref 1.7–8.2)
NEUTS SEG NFR BLD: 63 % (ref 43–78)
NRBC # BLD: 0 K/UL (ref 0–0.2)
PLATELET # BLD AUTO: 223 K/UL (ref 150–450)
PMV BLD AUTO: 9.2 FL (ref 9.4–12.3)
POTASSIUM SERPL-SCNC: 4.5 MMOL/L (ref 3.5–5.1)
PROT SERPL-MCNC: 7.4 G/DL (ref 6.3–8.2)
RBC # BLD AUTO: 4.23 M/UL (ref 4.05–5.2)
SODIUM SERPL-SCNC: 138 MMOL/L (ref 136–145)
WBC # BLD AUTO: 6.1 K/UL (ref 4.3–11.1)

## 2024-08-29 PROCEDURE — G8417 CALC BMI ABV UP PARAM F/U: HCPCS

## 2024-08-29 PROCEDURE — 1123F ACP DISCUSS/DSCN MKR DOCD: CPT

## 2024-08-29 PROCEDURE — 85025 COMPLETE CBC W/AUTO DIFF WBC: CPT

## 2024-08-29 PROCEDURE — 1036F TOBACCO NON-USER: CPT

## 2024-08-29 PROCEDURE — 99213 OFFICE O/P EST LOW 20 MIN: CPT

## 2024-08-29 PROCEDURE — G8399 PT W/DXA RESULTS DOCUMENT: HCPCS

## 2024-08-29 PROCEDURE — G8427 DOCREV CUR MEDS BY ELIG CLIN: HCPCS

## 2024-08-29 PROCEDURE — 1090F PRES/ABSN URINE INCON ASSESS: CPT

## 2024-08-29 PROCEDURE — 36415 COLL VENOUS BLD VENIPUNCTURE: CPT

## 2024-08-29 PROCEDURE — 80053 COMPREHEN METABOLIC PANEL: CPT

## 2024-08-29 RX ORDER — ANASTROZOLE 1 MG/1
1 TABLET ORAL DAILY
Qty: 90 TABLET | Refills: 3 | Status: SHIPPED | OUTPATIENT
Start: 2024-08-29

## 2024-08-29 ASSESSMENT — PATIENT HEALTH QUESTIONNAIRE - PHQ9
2. FEELING DOWN, DEPRESSED OR HOPELESS: NOT AT ALL
SUM OF ALL RESPONSES TO PHQ QUESTIONS 1-9: 0
SUM OF ALL RESPONSES TO PHQ QUESTIONS 1-9: 0
SUM OF ALL RESPONSES TO PHQ9 QUESTIONS 1 & 2: 0
SUM OF ALL RESPONSES TO PHQ QUESTIONS 1-9: 0
SUM OF ALL RESPONSES TO PHQ QUESTIONS 1-9: 0
1. LITTLE INTEREST OR PLEASURE IN DOING THINGS: NOT AT ALL

## 2024-09-05 ASSESSMENT — ENCOUNTER SYMPTOMS
CHEST TIGHTNESS: 0
HEMOPTYSIS: 0
VOICE CHANGE: 0
SCLERAL ICTERUS: 0
NAUSEA: 0
TROUBLE SWALLOWING: 0
ABDOMINAL DISTENTION: 0
WHEEZING: 0
SHORTNESS OF BREATH: 0
SORE THROAT: 0
ABDOMINAL PAIN: 0
BLOOD IN STOOL: 0
CONSTIPATION: 0
VOMITING: 0
DIARRHEA: 0

## 2024-09-06 NOTE — PROGRESS NOTES
[facility] Hematology and Oncology: Established patient - follow up     Chief Complaint   Patient presents with    Follow-up     Reason for Referral: Infiltrating ductal carcinoma   Referring Provider: Param Negron MD   Family History of Cancer/Hematologic Disorders: Family history is significant for sister with breast cancer at the age of 72.    Presenting Symptoms: Abnormal routine bilateral screening mammogram     History of Present Illness:  Ms. Paredes is a 78 y.o. female who presents today for follow up regarding breast cancer.  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  She initially presented for a routine bilateral screening mammogram on 2/26/22  which identified left breast asymmetry.  Further evaluation with targeted left breast ultrasound was completed on 3/3/22 confirming an irregular  hypoechoic mass at the 1 o'clock position of the left breast in the area of asymmetry as seen on the prior screening mammogram measuring 1.8 cm x 2.1 cm x 1.5 cm  and demonstrating multiple suspicious features.  Core needle biopsy and marker clip placement for the 2.1 cm irregular mass at the posterior 1:00 position in the left breast was performed on 3/7/22 with pathology revealing  low grade (well differentiated), infiltrating ductal carcinoma with lobular features and no definite in situ component or lymphovascular invasion identified.  Ms. Hannah is now referred to [facility] for  Medical Oncology evaluation and treatment of newly diagnosed left breast IDC.   At consultation, we discussed the pathophysiology of breast cancer,  staging, and the importance of receptor status in terms of treatment options.  We then reviewed her

## 2024-09-20 ENCOUNTER — HOSPITAL ENCOUNTER (OUTPATIENT)
Dept: LAB | Age: 79
Discharge: HOME | End: 2024-09-23
Payer: MEDICARE

## 2024-09-20 ENCOUNTER — INITIAL CONSULT (OUTPATIENT)
Age: 79
End: 2024-09-20
Payer: MEDICARE

## 2024-09-20 VITALS
HEART RATE: 83 BPM | WEIGHT: 193 LBS | BODY MASS INDEX: 30.29 KG/M2 | HEIGHT: 67 IN | SYSTOLIC BLOOD PRESSURE: 138 MMHG | DIASTOLIC BLOOD PRESSURE: 88 MMHG

## 2024-09-20 DIAGNOSIS — Z76.89 ENCOUNTER TO ESTABLISH CARE: Primary | ICD-10-CM

## 2024-09-20 DIAGNOSIS — E83.52 HYPERCALCEMIA: ICD-10-CM

## 2024-09-20 DIAGNOSIS — R00.2 PALPITATIONS: ICD-10-CM

## 2024-09-20 DIAGNOSIS — E83.52 HYPERCALCEMIA: Primary | ICD-10-CM

## 2024-09-20 LAB
ALBUMIN SERPL-MCNC: 4 G/DL (ref 3.2–4.6)
ALBUMIN/GLOB SERPL: 1.2 (ref 1–1.9)
ALP SERPL-CCNC: 98 U/L (ref 35–104)
ALT SERPL-CCNC: 40 U/L (ref 12–65)
ANION GAP SERPL CALC-SCNC: 11 MMOL/L (ref 9–18)
AST SERPL-CCNC: 56 U/L (ref 15–37)
BILIRUB SERPL-MCNC: 0.5 MG/DL (ref 0–1.2)
BUN SERPL-MCNC: 12 MG/DL (ref 8–23)
CALCIUM SERPL-MCNC: 10.8 MG/DL (ref 8.8–10.2)
CHLORIDE SERPL-SCNC: 104 MMOL/L (ref 98–107)
CO2 SERPL-SCNC: 23 MMOL/L (ref 20–28)
CREAT SERPL-MCNC: 0.89 MG/DL (ref 0.6–1.1)
GLOBULIN SER CALC-MCNC: 3.3 G/DL (ref 2.3–3.5)
GLUCOSE SERPL-MCNC: 101 MG/DL (ref 70–99)
POTASSIUM SERPL-SCNC: 4.6 MMOL/L (ref 3.5–5.1)
PROT SERPL-MCNC: 7.3 G/DL (ref 6.3–8.2)
SODIUM SERPL-SCNC: 138 MMOL/L (ref 136–145)

## 2024-09-20 PROCEDURE — G8417 CALC BMI ABV UP PARAM F/U: HCPCS | Performed by: INTERNAL MEDICINE

## 2024-09-20 PROCEDURE — 1036F TOBACCO NON-USER: CPT | Performed by: INTERNAL MEDICINE

## 2024-09-20 PROCEDURE — G8428 CUR MEDS NOT DOCUMENT: HCPCS | Performed by: INTERNAL MEDICINE

## 2024-09-20 PROCEDURE — 99204 OFFICE O/P NEW MOD 45 MIN: CPT | Performed by: INTERNAL MEDICINE

## 2024-09-20 PROCEDURE — 1090F PRES/ABSN URINE INCON ASSESS: CPT | Performed by: INTERNAL MEDICINE

## 2024-09-20 PROCEDURE — G8399 PT W/DXA RESULTS DOCUMENT: HCPCS | Performed by: INTERNAL MEDICINE

## 2024-09-20 PROCEDURE — 93000 ELECTROCARDIOGRAM COMPLETE: CPT | Performed by: INTERNAL MEDICINE

## 2024-09-20 PROCEDURE — 80053 COMPREHEN METABOLIC PANEL: CPT

## 2024-09-20 PROCEDURE — 36415 COLL VENOUS BLD VENIPUNCTURE: CPT

## 2024-09-20 PROCEDURE — 1123F ACP DISCUSS/DSCN MKR DOCD: CPT | Performed by: INTERNAL MEDICINE

## 2024-09-20 RX ORDER — ASPIRIN 81 MG/1
81 TABLET ORAL DAILY
COMMUNITY
Start: 2024-06-06

## 2024-09-20 RX ORDER — DULOXETIN HYDROCHLORIDE 20 MG/1
20 CAPSULE, DELAYED RELEASE ORAL DAILY
COMMUNITY
Start: 2024-09-11

## 2024-09-20 ASSESSMENT — ENCOUNTER SYMPTOMS
SHORTNESS OF BREATH: 0
ABDOMINAL PAIN: 0

## 2024-10-14 ENCOUNTER — TELEPHONE (OUTPATIENT)
Age: 79
End: 2024-10-14

## 2024-10-14 NOTE — TELEPHONE ENCOUNTER
----- Message from AKHIL CLEMESN MA sent at 10/9/2024  2:27 PM EDT -----    ----- Message -----  From: Oh Ramirez III, MD  Sent: 10/9/2024   2:12 PM EDT  To: Darleen Kimball MA    .  Monitor showed rare, short episodes of PSVT.  No other significant arrhythmia identified.  Would like to see back in the office to discuss neck steps

## 2024-10-24 ENCOUNTER — TELEPHONE (OUTPATIENT)
Dept: ONCOLOGY | Age: 79
End: 2024-10-24

## 2024-10-24 NOTE — TELEPHONE ENCOUNTER
Called and informed Mrs. Paredes that her Calcium level is still a little elevated. As per Shannon she needs to repeat it again either with her PCP or here. As per Mrs. Paredes, she just had surgery on her knee and would like to wait until she is feeling better. She said she will call back to schedule a lab appointment. Patient was agreeable, VU and appreciated the call.

## 2024-11-08 ENCOUNTER — OFFICE VISIT (OUTPATIENT)
Age: 79
End: 2024-11-08

## 2024-11-08 VITALS
BODY MASS INDEX: 31.14 KG/M2 | DIASTOLIC BLOOD PRESSURE: 96 MMHG | HEART RATE: 92 BPM | SYSTOLIC BLOOD PRESSURE: 146 MMHG | WEIGHT: 198.4 LBS | HEIGHT: 67 IN

## 2024-11-08 DIAGNOSIS — R00.2 PALPITATIONS: Primary | ICD-10-CM

## 2024-11-08 RX ORDER — METOPROLOL SUCCINATE 25 MG/1
25 TABLET, EXTENDED RELEASE ORAL DAILY
Qty: 30 TABLET | Refills: 3 | Status: SHIPPED | OUTPATIENT
Start: 2024-11-08

## 2024-11-08 ASSESSMENT — ENCOUNTER SYMPTOMS
ABDOMINAL PAIN: 0
SHORTNESS OF BREATH: 0

## 2024-11-08 NOTE — PROGRESS NOTES
to eye 12/28/21  Yes Automatic Reconciliation, Ar   celecoxib (CELEBREX) 200 MG capsule TAKE 1 CAPSULE TWICE DAILY WITH FOOD   Yes Automatic Reconciliation, Ar   cyanocobalamin 1000 MCG tablet Take 1 tablet by mouth daily   Yes Automatic Reconciliation, Ar   levothyroxine (SYNTHROID) 50 MCG tablet Take 1 tablet by mouth every morning (before breakfast) 1/12/22  Yes Automatic Reconciliation, Ar   rosuvastatin (CRESTOR) 40 MG tablet Take 1 tablet by mouth 1/12/22  Yes Automatic Reconciliation, Ar   venlafaxine (EFFEXOR XR) 37.5 MG extended release capsule Take 1 capsule by mouth daily Start after completing 1 week of 37.5 mg dose.  Patient not taking: Reported on 4/26/2024 11/3/23   Cris Brand MD   letrozole (FEMARA) 2.5 MG tablet Take 1 tablet by mouth daily for 10 days 9/12/23 9/22/23  Francie Billingsley APRN - NP   letrozole (FEMARA) 2.5 MG tablet Take 1 tablet by mouth daily  Patient not taking: Reported on 4/26/2024 11/9/22   Francie Billingsley APRN - NP   Amoxicillin 500 MG TABS  8/11/22   ProviderHowie MD   coenzyme Q10 100 MG CAPS capsule 1 capsule with a meal  Patient not taking: Reported on 5/19/2023    Automatic Reconciliation, Ar     Allergies   Allergen Reactions    Hydrocodone-Acetaminophen Itching     Other reaction(s): itch, Itching-Allergy    Levofloxacin Nausea And Vomiting     Other reaction(s): Nausea and/or vomiting-Intolerance     Past Medical History:   Diagnosis Date    Anxiety     Arthritis     Bilateral Knees    Glaucoma     Hypercholesterolemia     Thyroid disease     Hypo     Past Surgical History:   Procedure Laterality Date    BACK SURGERY      L5    BREAST BIOPSY Left 5/3/2022    LEFT PARTIAL MASTECTOMY WITH WIRE  LOC performed by Param Dewitt MD at [facility] OR    BREAST BIOPSY Left 06/09/2022    REEXCISION OF BREAST MARGINS X 3 performed by Param Dewitt MD at [facility] OR    BREAST LUMPECTOMY      COLONOSCOPY  2021    Dr. Jackson [facility]

## 2024-11-25 ENCOUNTER — HOSPITAL ENCOUNTER (OUTPATIENT)
Dept: MAMMOGRAPHY | Age: 79
Discharge: HOME | End: 2024-11-28
Payer: MEDICARE

## 2024-11-25 VITALS — HEIGHT: 67 IN | BODY MASS INDEX: 31.39 KG/M2 | WEIGHT: 200 LBS

## 2024-11-25 DIAGNOSIS — Z17.0 MALIGNANT NEOPLASM OF UPPER-OUTER QUADRANT OF LEFT BREAST IN FEMALE, ESTROGEN RECEPTOR POSITIVE (HCC): ICD-10-CM

## 2024-11-25 DIAGNOSIS — C50.412 MALIGNANT NEOPLASM OF UPPER-OUTER QUADRANT OF LEFT BREAST IN FEMALE, ESTROGEN RECEPTOR POSITIVE (HCC): ICD-10-CM

## 2024-11-25 DIAGNOSIS — Z12.31 ENCOUNTER FOR SCREENING MAMMOGRAM FOR MALIGNANT NEOPLASM OF BREAST: ICD-10-CM

## 2024-11-25 DIAGNOSIS — Z12.39 BREAST CANCER SCREENING, HIGH RISK PATIENT: ICD-10-CM

## 2024-11-25 PROCEDURE — 77063 BREAST TOMOSYNTHESIS BI: CPT

## 2025-02-04 ENCOUNTER — OFFICE VISIT (OUTPATIENT)
Dept: ONCOLOGY | Age: 80
End: 2025-02-04
Payer: MEDICARE

## 2025-02-04 ENCOUNTER — HOSPITAL ENCOUNTER (OUTPATIENT)
Dept: LAB | Age: 80
Discharge: HOME | End: 2025-02-04
Payer: MEDICARE

## 2025-02-04 VITALS
SYSTOLIC BLOOD PRESSURE: 154 MMHG | BODY MASS INDEX: 32.49 KG/M2 | DIASTOLIC BLOOD PRESSURE: 101 MMHG | WEIGHT: 207 LBS | HEIGHT: 67 IN | OXYGEN SATURATION: 97 % | RESPIRATION RATE: 18 BRPM | HEART RATE: 83 BPM | TEMPERATURE: 97.5 F

## 2025-02-04 DIAGNOSIS — Z12.39 BREAST CANCER SCREENING, HIGH RISK PATIENT: ICD-10-CM

## 2025-02-04 DIAGNOSIS — Z17.0 MALIGNANT NEOPLASM OF UPPER-OUTER QUADRANT OF LEFT BREAST IN FEMALE, ESTROGEN RECEPTOR POSITIVE (HCC): ICD-10-CM

## 2025-02-04 DIAGNOSIS — R23.2 HOT FLASHES RELATED TO AROMATASE INHIBITOR THERAPY: ICD-10-CM

## 2025-02-04 DIAGNOSIS — C50.412 MALIGNANT NEOPLASM OF UPPER-OUTER QUADRANT OF LEFT BREAST IN FEMALE, ESTROGEN RECEPTOR POSITIVE (HCC): Primary | ICD-10-CM

## 2025-02-04 DIAGNOSIS — Z79.811 LONG TERM (CURRENT) USE OF AROMATASE INHIBITORS: ICD-10-CM

## 2025-02-04 DIAGNOSIS — E55.9 VITAMIN D DEFICIENCY: ICD-10-CM

## 2025-02-04 DIAGNOSIS — C50.412 MALIGNANT NEOPLASM OF UPPER-OUTER QUADRANT OF LEFT BREAST IN FEMALE, ESTROGEN RECEPTOR POSITIVE (HCC): ICD-10-CM

## 2025-02-04 DIAGNOSIS — T45.1X5A HOT FLASHES RELATED TO AROMATASE INHIBITOR THERAPY: ICD-10-CM

## 2025-02-04 DIAGNOSIS — Z79.811 AROMATASE INHIBITOR USE: ICD-10-CM

## 2025-02-04 DIAGNOSIS — Z17.0 MALIGNANT NEOPLASM OF UPPER-OUTER QUADRANT OF LEFT BREAST IN FEMALE, ESTROGEN RECEPTOR POSITIVE (HCC): Primary | ICD-10-CM

## 2025-02-04 LAB
ALBUMIN SERPL-MCNC: 3.7 G/DL (ref 3.2–4.6)
ALBUMIN/GLOB SERPL: 1.1 (ref 1–1.9)
ALP SERPL-CCNC: 92 U/L (ref 35–104)
ALT SERPL-CCNC: 25 U/L (ref 8–45)
ANION GAP SERPL CALC-SCNC: 12 MMOL/L (ref 7–16)
AST SERPL-CCNC: 36 U/L (ref 15–37)
BASOPHILS # BLD: 0.05 K/UL (ref 0–0.2)
BASOPHILS NFR BLD: 0.7 % (ref 0–2)
BILIRUB SERPL-MCNC: 0.4 MG/DL (ref 0–1.2)
BUN SERPL-MCNC: 14 MG/DL (ref 8–23)
CALCIUM SERPL-MCNC: 9.7 MG/DL (ref 8.8–10.2)
CHLORIDE SERPL-SCNC: 106 MMOL/L (ref 98–107)
CO2 SERPL-SCNC: 22 MMOL/L (ref 20–29)
CREAT SERPL-MCNC: 0.79 MG/DL (ref 0.6–1.1)
DIFFERENTIAL METHOD BLD: ABNORMAL
EOSINOPHIL # BLD: 0.2 K/UL (ref 0–0.8)
EOSINOPHIL NFR BLD: 2.9 % (ref 0.5–7.8)
ERYTHROCYTE [DISTWIDTH] IN BLOOD BY AUTOMATED COUNT: 13.4 % (ref 11.9–14.6)
GLOBULIN SER CALC-MCNC: 3.4 G/DL (ref 2.3–3.5)
GLUCOSE SERPL-MCNC: 96 MG/DL (ref 70–99)
HCT VFR BLD AUTO: 39.1 % (ref 35.8–46.3)
HGB BLD-MCNC: 13.1 G/DL (ref 11.7–15.4)
IMM GRANULOCYTES # BLD AUTO: 0.02 K/UL (ref 0–0.5)
IMM GRANULOCYTES NFR BLD AUTO: 0.3 % (ref 0–5)
LYMPHOCYTES # BLD: 0.81 K/UL (ref 0.5–4.6)
LYMPHOCYTES NFR BLD: 11.7 % (ref 13–44)
MCH RBC QN AUTO: 33.8 PG (ref 26.1–32.9)
MCHC RBC AUTO-ENTMCNC: 33.5 G/DL (ref 31.4–35)
MCV RBC AUTO: 100.8 FL (ref 82–102)
MONOCYTES # BLD: 0.81 K/UL (ref 0.1–1.3)
MONOCYTES NFR BLD: 11.7 % (ref 4–12)
NEUTS SEG # BLD: 5.01 K/UL (ref 1.7–8.2)
NEUTS SEG NFR BLD: 72.7 % (ref 43–78)
NRBC # BLD: 0 K/UL (ref 0–0.2)
PLATELET # BLD AUTO: 213 K/UL (ref 150–450)
PMV BLD AUTO: 8.9 FL (ref 9.4–12.3)
POTASSIUM SERPL-SCNC: 4.9 MMOL/L (ref 3.5–5.1)
PROT SERPL-MCNC: 7.1 G/DL (ref 6.3–8.2)
RBC # BLD AUTO: 3.88 M/UL (ref 4.05–5.2)
SODIUM SERPL-SCNC: 140 MMOL/L (ref 136–145)
WBC # BLD AUTO: 6.9 K/UL (ref 4.3–11.1)

## 2025-02-04 PROCEDURE — 1090F PRES/ABSN URINE INCON ASSESS: CPT

## 2025-02-04 PROCEDURE — G8427 DOCREV CUR MEDS BY ELIG CLIN: HCPCS

## 2025-02-04 PROCEDURE — 1126F AMNT PAIN NOTED NONE PRSNT: CPT

## 2025-02-04 PROCEDURE — 80053 COMPREHEN METABOLIC PANEL: CPT

## 2025-02-04 PROCEDURE — 36415 COLL VENOUS BLD VENIPUNCTURE: CPT

## 2025-02-04 PROCEDURE — G8399 PT W/DXA RESULTS DOCUMENT: HCPCS

## 2025-02-04 PROCEDURE — 85025 COMPLETE CBC W/AUTO DIFF WBC: CPT

## 2025-02-04 PROCEDURE — 99213 OFFICE O/P EST LOW 20 MIN: CPT

## 2025-02-04 PROCEDURE — 1123F ACP DISCUSS/DSCN MKR DOCD: CPT

## 2025-02-04 PROCEDURE — G8417 CALC BMI ABV UP PARAM F/U: HCPCS

## 2025-02-04 PROCEDURE — 1036F TOBACCO NON-USER: CPT

## 2025-02-11 ENCOUNTER — OFFICE VISIT (OUTPATIENT)
Age: 80
End: 2025-02-11
Payer: MEDICARE

## 2025-02-11 VITALS
BODY MASS INDEX: 32.33 KG/M2 | SYSTOLIC BLOOD PRESSURE: 144 MMHG | HEART RATE: 71 BPM | DIASTOLIC BLOOD PRESSURE: 92 MMHG | WEIGHT: 206 LBS | HEIGHT: 67 IN

## 2025-02-11 DIAGNOSIS — R00.2 PALPITATIONS: ICD-10-CM

## 2025-02-11 PROCEDURE — G8428 CUR MEDS NOT DOCUMENT: HCPCS | Performed by: INTERNAL MEDICINE

## 2025-02-11 PROCEDURE — 1126F AMNT PAIN NOTED NONE PRSNT: CPT | Performed by: INTERNAL MEDICINE

## 2025-02-11 PROCEDURE — G8399 PT W/DXA RESULTS DOCUMENT: HCPCS | Performed by: INTERNAL MEDICINE

## 2025-02-11 PROCEDURE — 99214 OFFICE O/P EST MOD 30 MIN: CPT | Performed by: INTERNAL MEDICINE

## 2025-02-11 PROCEDURE — 1090F PRES/ABSN URINE INCON ASSESS: CPT | Performed by: INTERNAL MEDICINE

## 2025-02-11 PROCEDURE — G8417 CALC BMI ABV UP PARAM F/U: HCPCS | Performed by: INTERNAL MEDICINE

## 2025-02-11 PROCEDURE — 1123F ACP DISCUSS/DSCN MKR DOCD: CPT | Performed by: INTERNAL MEDICINE

## 2025-02-11 PROCEDURE — 1036F TOBACCO NON-USER: CPT | Performed by: INTERNAL MEDICINE

## 2025-02-11 RX ORDER — METOPROLOL SUCCINATE 25 MG/1
25 TABLET, EXTENDED RELEASE ORAL DAILY
Qty: 90 TABLET | Refills: 3 | Status: SHIPPED | OUTPATIENT
Start: 2025-02-11 | End: 2026-02-06

## 2025-02-11 ASSESSMENT — ENCOUNTER SYMPTOMS
ABDOMINAL PAIN: 0
SHORTNESS OF BREATH: 0

## 2025-02-11 NOTE — PROGRESS NOTES
CHOLECYSTOSTOMY PERCUTANEOUS COMPLETE      KNEE ARTHROSCOPY Bilateral     US BREAST BIOPSY W LOC DEVICE 1ST LESION LEFT Left 03/07/2022    US BREAST NEEDLE BIOPSY LEFT 3/7/2022 [facility]    US PLACE BREAST LOC DEVICE 1ST LESION LEFT Left 05/03/2022    US GUIDED NEEDLE LOC OF LEFT BREAST 5/3/2022 [facility]     Family History   Problem Relation Age of Onset    Breast Cancer Sister 72    Heart Disease Paternal Grandfather     Heart Disease Paternal Grandmother     Heart Disease Father     Diabetes Mother      Social History     Tobacco Use    Smoking status: Never    Smokeless tobacco: Never   Substance Use Topics    Alcohol use: Yes     Alcohol/week: 7.0 standard drinks of alcohol     Types: 7 Glasses of wine per week       ROS:    Review of Systems   Constitutional: Negative for chills, diaphoresis and fever.   HENT:  Negative for hearing loss.    Eyes:  Negative for visual disturbance.   Cardiovascular:  Positive for palpitations.        As per the HPI   Respiratory:  Negative for shortness of breath.    Hematologic/Lymphatic: Does not bruise/bleed easily.   Gastrointestinal:  Negative for abdominal pain.   Genitourinary:  Negative for dysuria.   Neurological:  Negative for focal weakness.   Psychiatric/Behavioral:  Negative for suicidal ideas.           PHYSICAL EXAM:   BP (!) 144/92   Pulse 71   Ht 1.702 m (5' 7\")   Wt 93.4 kg (206 lb)   BMI 32.26 kg/m²      Wt Readings from Last 3 Encounters:   02/11/25 93.4 kg (206 lb)   02/04/25 93.9 kg (207 lb)   11/25/24 90.7 kg (200 lb)     BP Readings from Last 3 Encounters:   02/11/25 (!) 144/92   02/04/25 (!) 154/101   11/08/24 (!) 146/96     Pulse Readings from Last 3 Encounters:   02/11/25 71   02/04/25 83   11/08/24 92           Physical Exam  Vitals reviewed.   Constitutional:       Appearance: Normal appearance.      Comments: Appears younger than stated age   HENT:      Head: Normocephalic and atraumatic.   Eyes:      General: No scleral

## 2025-02-15 ASSESSMENT — ENCOUNTER SYMPTOMS
CHEST TIGHTNESS: 0
NAUSEA: 0
ABDOMINAL PAIN: 0
ABDOMINAL DISTENTION: 0
DIARRHEA: 0
SORE THROAT: 0
CONSTIPATION: 0
TROUBLE SWALLOWING: 0
VOMITING: 0
VOICE CHANGE: 0
WHEEZING: 0
BLOOD IN STOOL: 0
SHORTNESS OF BREATH: 0
HEMOPTYSIS: 0
SCLERAL ICTERUS: 0

## 2025-02-15 NOTE — PROGRESS NOTES
[facility] Hematology and Oncology: Established patient - follow up     Chief Complaint   Patient presents with    Follow-up     Reason for Referral: Infiltrating ductal carcinoma   Referring Provider: Param Negron MD   Family History of Cancer/Hematologic Disorders: Family history is significant for sister with breast cancer at the age of 72.    Presenting Symptoms: Abnormal routine bilateral screening mammogram     History of Present Illness:  Ms. Paredes is a 79 y.o. female who presents today for follow up regarding breast cancer.  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  She initially presented for a routine bilateral screening mammogram on 2/26/22  which identified left breast asymmetry.  Further evaluation with targeted left breast ultrasound was completed on 3/3/22 confirming an irregular  hypoechoic mass at the 1 o'clock position of the left breast in the area of asymmetry as seen on the prior screening mammogram measuring 1.8 cm x 2.1 cm x 1.5 cm  and demonstrating multiple suspicious features.  Core needle biopsy and marker clip placement for the 2.1 cm irregular mass at the posterior 1:00 position in the left breast was performed on 3/7/22 with pathology revealing  low grade (well differentiated), infiltrating ductal carcinoma with lobular features and no definite in situ component or lymphovascular invasion identified.  Ms. Hannah is now referred to [facility] for  Medical Oncology evaluation and treatment of newly diagnosed left breast IDC.   At consultation, we discussed the pathophysiology of breast cancer,  staging, and the importance of receptor status in terms of treatment options.  We then reviewed her

## 2025-05-15 ENCOUNTER — HOSPITAL ENCOUNTER (OUTPATIENT)
Dept: MAMMOGRAPHY | Age: 80
Discharge: HOME | End: 2025-05-18
Payer: MEDICARE

## 2025-05-15 ENCOUNTER — HOSPITAL ENCOUNTER (OUTPATIENT)
Dept: MRI IMAGING | Age: 80
Discharge: HOME | End: 2025-05-18
Payer: MEDICARE

## 2025-05-15 DIAGNOSIS — Z79.811 AROMATASE INHIBITOR USE: ICD-10-CM

## 2025-05-15 DIAGNOSIS — Z17.0 MALIGNANT NEOPLASM OF UPPER-OUTER QUADRANT OF LEFT BREAST IN FEMALE, ESTROGEN RECEPTOR POSITIVE (HCC): ICD-10-CM

## 2025-05-15 DIAGNOSIS — Z12.39 BREAST CANCER SCREENING, HIGH RISK PATIENT: ICD-10-CM

## 2025-05-15 DIAGNOSIS — C50.412 MALIGNANT NEOPLASM OF UPPER-OUTER QUADRANT OF LEFT BREAST IN FEMALE, ESTROGEN RECEPTOR POSITIVE (HCC): ICD-10-CM

## 2025-05-15 PROCEDURE — 6360000004 HC RX CONTRAST MEDICATION

## 2025-05-15 PROCEDURE — A9579 GAD-BASE MR CONTRAST NOS,1ML: HCPCS

## 2025-05-15 PROCEDURE — C8908 MRI W/O FOL W/CONT, BREAST,: HCPCS

## 2025-05-15 RX ADMIN — GADOTERIDOL 19 ML: 279.3 INJECTION, SOLUTION INTRAVENOUS at 15:08

## 2025-05-16 ENCOUNTER — RESULTS FOLLOW-UP (OUTPATIENT)
Dept: PULMONOLOGY | Age: 80
End: 2025-05-16

## 2025-06-09 ENCOUNTER — HOSPITAL ENCOUNTER (OUTPATIENT)
Dept: MAMMOGRAPHY | Age: 80
Discharge: HOME | End: 2025-06-12
Payer: MEDICARE

## 2025-06-09 PROCEDURE — 77080 DXA BONE DENSITY AXIAL: CPT

## 2025-06-23 DIAGNOSIS — Z17.0 MALIGNANT NEOPLASM OF UPPER-OUTER QUADRANT OF LEFT BREAST IN FEMALE, ESTROGEN RECEPTOR POSITIVE (HCC): ICD-10-CM

## 2025-06-23 DIAGNOSIS — C50.412 MALIGNANT NEOPLASM OF UPPER-OUTER QUADRANT OF LEFT BREAST IN FEMALE, ESTROGEN RECEPTOR POSITIVE (HCC): ICD-10-CM

## 2025-06-23 DIAGNOSIS — E55.9 VITAMIN D DEFICIENCY: ICD-10-CM

## 2025-06-23 NOTE — PROGRESS NOTES
[facility] Hematology and Oncology: Established patient - follow up     Chief Complaint   Patient presents with    Follow-up     Reason for Referral: Infiltrating ductal carcinoma   Referring Provider: Param Negron MD   Family History of Cancer/Hematologic Disorders: Family history is significant for sister with breast cancer at the age of 72.    Presenting Symptoms: Abnormal routine bilateral screening mammogram     History of Present Illness:  Ms. Paredes is a 79 y.o. female who presents today for follow up regarding breast cancer.  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  She initially presented for a routine bilateral screening mammogram on 2/26/22  which identified left breast asymmetry.  Further evaluation with targeted left breast ultrasound was completed on 3/3/22 confirming an irregular  hypoechoic mass at the 1 o'clock position of the left breast in the area of asymmetry as seen on the prior screening mammogram measuring 1.8 cm x 2.1 cm x 1.5 cm  and demonstrating multiple suspicious features.  Core needle biopsy and marker clip placement for the 2.1 cm irregular mass at the posterior 1:00 position in the left breast was performed on 3/7/22 with pathology revealing  low grade (well differentiated), infiltrating ductal carcinoma with lobular features and no definite in situ component or lymphovascular invasion identified.  Ms. Hannah is now referred to [facility] for  Medical Oncology evaluation and treatment of newly diagnosed left breast IDC.   At consultation, we discussed the pathophysiology of breast cancer,  staging, and the importance of receptor status in terms of treatment options.  We then reviewed her

## 2025-07-02 ENCOUNTER — RESULTS FOLLOW-UP (OUTPATIENT)
Dept: ONCOLOGY | Age: 80
End: 2025-07-02

## 2025-07-02 ENCOUNTER — OFFICE VISIT (OUTPATIENT)
Dept: ONCOLOGY | Age: 80
End: 2025-07-02
Payer: MEDICARE

## 2025-07-02 ENCOUNTER — HOSPITAL ENCOUNTER (OUTPATIENT)
Dept: LAB | Age: 80
Discharge: HOME | End: 2025-07-02
Payer: MEDICARE

## 2025-07-02 VITALS
HEART RATE: 68 BPM | SYSTOLIC BLOOD PRESSURE: 143 MMHG | BODY MASS INDEX: 32.02 KG/M2 | TEMPERATURE: 98.4 F | WEIGHT: 204 LBS | DIASTOLIC BLOOD PRESSURE: 79 MMHG | OXYGEN SATURATION: 98 % | RESPIRATION RATE: 16 BRPM | HEIGHT: 67 IN

## 2025-07-02 DIAGNOSIS — Z12.39 BREAST CANCER SCREENING, HIGH RISK PATIENT: ICD-10-CM

## 2025-07-02 DIAGNOSIS — R77.1 ELEVATED SERUM GLOBULIN LEVEL: ICD-10-CM

## 2025-07-02 DIAGNOSIS — Z17.0 MALIGNANT NEOPLASM OF UPPER-OUTER QUADRANT OF LEFT BREAST IN FEMALE, ESTROGEN RECEPTOR POSITIVE (HCC): Primary | ICD-10-CM

## 2025-07-02 DIAGNOSIS — C50.412 MALIGNANT NEOPLASM OF UPPER-OUTER QUADRANT OF LEFT BREAST IN FEMALE, ESTROGEN RECEPTOR POSITIVE (HCC): Primary | ICD-10-CM

## 2025-07-02 DIAGNOSIS — Z17.0 MALIGNANT NEOPLASM OF UPPER-OUTER QUADRANT OF LEFT BREAST IN FEMALE, ESTROGEN RECEPTOR POSITIVE (HCC): ICD-10-CM

## 2025-07-02 DIAGNOSIS — C50.412 MALIGNANT NEOPLASM OF UPPER-OUTER QUADRANT OF LEFT BREAST IN FEMALE, ESTROGEN RECEPTOR POSITIVE (HCC): ICD-10-CM

## 2025-07-02 DIAGNOSIS — Z12.31 ENCOUNTER FOR SCREENING MAMMOGRAM FOR MALIGNANT NEOPLASM OF BREAST: ICD-10-CM

## 2025-07-02 DIAGNOSIS — M85.80 OSTEOPENIA, UNSPECIFIED LOCATION: ICD-10-CM

## 2025-07-02 DIAGNOSIS — E55.9 VITAMIN D DEFICIENCY: ICD-10-CM

## 2025-07-02 LAB
25(OH)D3 SERPL-MCNC: 60.2 NG/ML (ref 30–100)
ALBUMIN SERPL-MCNC: 3.7 G/DL (ref 3.2–4.6)
ALBUMIN/GLOB SERPL: 0.9 (ref 1–1.9)
ALP SERPL-CCNC: 99 U/L (ref 35–104)
ALT SERPL-CCNC: 34 U/L (ref 8–45)
ANION GAP SERPL CALC-SCNC: 9 MMOL/L (ref 7–16)
AST SERPL-CCNC: 45 U/L (ref 15–37)
BASOPHILS # BLD: 0.07 K/UL (ref 0–0.2)
BASOPHILS NFR BLD: 1.3 % (ref 0–2)
BILIRUB SERPL-MCNC: 0.5 MG/DL (ref 0–1.2)
BUN SERPL-MCNC: 12 MG/DL (ref 8–23)
CALCIUM SERPL-MCNC: 10.2 MG/DL (ref 8.8–10.2)
CHLORIDE SERPL-SCNC: 104 MMOL/L (ref 98–107)
CO2 SERPL-SCNC: 24 MMOL/L (ref 20–29)
CREAT SERPL-MCNC: 0.98 MG/DL (ref 0.6–1.1)
DIFFERENTIAL METHOD BLD: ABNORMAL
EOSINOPHIL # BLD: 0.11 K/UL (ref 0–0.8)
EOSINOPHIL NFR BLD: 2.1 % (ref 0.5–7.8)
ERYTHROCYTE [DISTWIDTH] IN BLOOD BY AUTOMATED COUNT: 12.9 % (ref 11.9–14.6)
GLOBULIN SER CALC-MCNC: 4 G/DL (ref 2.3–3.5)
GLUCOSE SERPL-MCNC: 96 MG/DL (ref 70–99)
HCT VFR BLD AUTO: 43 % (ref 35.8–46.3)
HGB BLD-MCNC: 14.7 G/DL (ref 11.7–15.4)
IMM GRANULOCYTES # BLD AUTO: 0.01 K/UL (ref 0–0.5)
IMM GRANULOCYTES NFR BLD AUTO: 0.2 % (ref 0–5)
KAPPA LC FREE SER-MCNC: 18.1 MG/L (ref 2.4–20.7)
KAPPA LC FREE/LAMBDA FREE SER: 0.9 (ref 0.2–0.8)
LAMBDA LC FREE SERPL-MCNC: 19.4 MG/L (ref 4.2–27.7)
LYMPHOCYTES # BLD: 1.03 K/UL (ref 0.5–4.6)
LYMPHOCYTES NFR BLD: 19.3 % (ref 13–44)
MCH RBC QN AUTO: 34.3 PG (ref 26.1–32.9)
MCHC RBC AUTO-ENTMCNC: 34.2 G/DL (ref 31.4–35)
MCV RBC AUTO: 100.5 FL (ref 82–102)
MONOCYTES # BLD: 0.52 K/UL (ref 0.1–1.3)
MONOCYTES NFR BLD: 9.8 % (ref 4–12)
NEUTS SEG # BLD: 3.59 K/UL (ref 1.7–8.2)
NEUTS SEG NFR BLD: 67.3 % (ref 43–78)
NRBC # BLD: 0 K/UL (ref 0–0.2)
PLATELET # BLD AUTO: 228 K/UL (ref 150–450)
PMV BLD AUTO: 9.2 FL (ref 9.4–12.3)
POTASSIUM SERPL-SCNC: 4.4 MMOL/L (ref 3.5–5.1)
PROT SERPL-MCNC: 7.7 G/DL (ref 6.3–8.2)
RBC # BLD AUTO: 4.28 M/UL (ref 4.05–5.2)
SODIUM SERPL-SCNC: 137 MMOL/L (ref 136–145)
WBC # BLD AUTO: 5.3 K/UL (ref 4.3–11.1)

## 2025-07-02 PROCEDURE — 1036F TOBACCO NON-USER: CPT | Performed by: INTERNAL MEDICINE

## 2025-07-02 PROCEDURE — 1159F MED LIST DOCD IN RCRD: CPT | Performed by: INTERNAL MEDICINE

## 2025-07-02 PROCEDURE — G2211 COMPLEX E/M VISIT ADD ON: HCPCS | Performed by: INTERNAL MEDICINE

## 2025-07-02 PROCEDURE — G8399 PT W/DXA RESULTS DOCUMENT: HCPCS | Performed by: INTERNAL MEDICINE

## 2025-07-02 PROCEDURE — 82306 VITAMIN D 25 HYDROXY: CPT

## 2025-07-02 PROCEDURE — 80053 COMPREHEN METABOLIC PANEL: CPT

## 2025-07-02 PROCEDURE — 36415 COLL VENOUS BLD VENIPUNCTURE: CPT

## 2025-07-02 PROCEDURE — 1123F ACP DISCUSS/DSCN MKR DOCD: CPT | Performed by: INTERNAL MEDICINE

## 2025-07-02 PROCEDURE — 85025 COMPLETE CBC W/AUTO DIFF WBC: CPT

## 2025-07-02 PROCEDURE — 1126F AMNT PAIN NOTED NONE PRSNT: CPT | Performed by: INTERNAL MEDICINE

## 2025-07-02 PROCEDURE — 99214 OFFICE O/P EST MOD 30 MIN: CPT | Performed by: INTERNAL MEDICINE

## 2025-07-02 PROCEDURE — 1160F RVW MEDS BY RX/DR IN RCRD: CPT | Performed by: INTERNAL MEDICINE

## 2025-07-02 PROCEDURE — G8427 DOCREV CUR MEDS BY ELIG CLIN: HCPCS | Performed by: INTERNAL MEDICINE

## 2025-07-02 PROCEDURE — 1090F PRES/ABSN URINE INCON ASSESS: CPT | Performed by: INTERNAL MEDICINE

## 2025-07-02 PROCEDURE — 83521 IG LIGHT CHAINS FREE EACH: CPT

## 2025-07-02 PROCEDURE — G8417 CALC BMI ABV UP PARAM F/U: HCPCS | Performed by: INTERNAL MEDICINE

## 2025-07-02 ASSESSMENT — PATIENT HEALTH QUESTIONNAIRE - PHQ9
SUM OF ALL RESPONSES TO PHQ QUESTIONS 1-9: 0
SUM OF ALL RESPONSES TO PHQ QUESTIONS 1-9: 0
2. FEELING DOWN, DEPRESSED OR HOPELESS: NOT AT ALL
SUM OF ALL RESPONSES TO PHQ QUESTIONS 1-9: 0
1. LITTLE INTEREST OR PLEASURE IN DOING THINGS: NOT AT ALL
SUM OF ALL RESPONSES TO PHQ QUESTIONS 1-9: 0

## 2025-07-02 NOTE — PATIENT INSTRUCTIONS
Basophils Absolute 07/02/2025 0.07  0.00 - 0.20 K/UL Final    Immature Granulocytes Absolute 07/02/2025 0.01  0.00 - 0.50 K/UL Final    Differential Type 07/02/2025 AUTOMATED    Final    Sodium 07/02/2025 137  136 - 145 mmol/L Final    Potassium 07/02/2025 4.4  3.5 - 5.1 mmol/L Final    Chloride 07/02/2025 104  98 - 107 mmol/L Final    CO2 07/02/2025 24  20 - 29 mmol/L Final    Anion Gap 07/02/2025 9  7 - 16 mmol/L Final    Glucose 07/02/2025 96  70 - 99 mg/dL Final    Comment: <70 mg/dL Consistent with, but not fully diagnostic of hypoglycemia.  100 - 125 mg/dL Impaired fasting glucose/consistent with pre-diabetes mellitus.  > 126 mg/dl Fasting glucose consistent with overt diabetes mellitus      BUN 07/02/2025 12  8 - 23 MG/DL Final    Creatinine 07/02/2025 0.98  0.60 - 1.10 MG/DL Final    Est, Glom Filt Rate 07/02/2025 59 (L)  >60 ml/min/1.73m2 Final    Comment:    Pediatric calculator link: https://www.kidney.org/professionals/kdoqi/gfr_calculatorped     These results are not intended for use in patients <18 years of age.     eGFR results are calculated without a race factor using  the 2021 CKD-EPI equation. Careful clinical correlation is recommended, particularly when comparing to results calculated using previous equations.  The CKD-EPI equation is less accurate in patients with extremes of muscle mass, extra-renal metabolism of creatinine, excessive creatine ingestion, or following therapy that affects renal tubular secretion.      Calcium 07/02/2025 10.2  8.8 - 10.2 MG/DL Final    Total Bilirubin 07/02/2025 0.5  0.0 - 1.2 MG/DL Final    ALT 07/02/2025 34  8 - 45 U/L Final    AST 07/02/2025 45 (H)  15 - 37 U/L Final    Alk Phosphatase 07/02/2025 99  35 - 104 U/L Final    Total Protein 07/02/2025 7.7  6.3 - 8.2 g/dL Final    Albumin 07/02/2025 3.7  3.2 - 4.6 g/dL Final    Globulin 07/02/2025 4.0 (H)  2.3 - 3.5 g/dL Final    Albumin/Globulin Ratio 07/02/2025 0.9 (L)  1.0 - 1.9   Final           Please refer to

## 2025-07-06 DIAGNOSIS — C50.412 MALIGNANT NEOPLASM OF UPPER-OUTER QUADRANT OF LEFT BREAST IN FEMALE, ESTROGEN RECEPTOR POSITIVE (HCC): ICD-10-CM

## 2025-07-06 DIAGNOSIS — Z79.811 AROMATASE INHIBITOR USE: ICD-10-CM

## 2025-07-06 DIAGNOSIS — Z17.0 MALIGNANT NEOPLASM OF UPPER-OUTER QUADRANT OF LEFT BREAST IN FEMALE, ESTROGEN RECEPTOR POSITIVE (HCC): ICD-10-CM

## 2025-07-07 RX ORDER — ANASTROZOLE 1 MG/1
1 TABLET ORAL DAILY
Qty: 90 TABLET | Refills: 3 | OUTPATIENT
Start: 2025-07-07

## 2025-08-01 PROBLEM — Z12.39 BREAST CANCER SCREENING, HIGH RISK PATIENT: Status: RESOLVED | Noted: 2025-07-02 | Resolved: 2025-08-01

## 2025-08-12 ENCOUNTER — OFFICE VISIT (OUTPATIENT)
Age: 80
End: 2025-08-12
Payer: MEDICARE

## 2025-08-12 VITALS
HEIGHT: 67 IN | BODY MASS INDEX: 32.18 KG/M2 | HEART RATE: 72 BPM | SYSTOLIC BLOOD PRESSURE: 148 MMHG | DIASTOLIC BLOOD PRESSURE: 90 MMHG | WEIGHT: 205 LBS

## 2025-08-12 DIAGNOSIS — R00.2 PALPITATIONS: Primary | ICD-10-CM

## 2025-08-12 PROCEDURE — 1090F PRES/ABSN URINE INCON ASSESS: CPT | Performed by: INTERNAL MEDICINE

## 2025-08-12 PROCEDURE — 1159F MED LIST DOCD IN RCRD: CPT | Performed by: INTERNAL MEDICINE

## 2025-08-12 PROCEDURE — G8417 CALC BMI ABV UP PARAM F/U: HCPCS | Performed by: INTERNAL MEDICINE

## 2025-08-12 PROCEDURE — G8399 PT W/DXA RESULTS DOCUMENT: HCPCS | Performed by: INTERNAL MEDICINE

## 2025-08-12 PROCEDURE — 99214 OFFICE O/P EST MOD 30 MIN: CPT | Performed by: INTERNAL MEDICINE

## 2025-08-12 PROCEDURE — G8427 DOCREV CUR MEDS BY ELIG CLIN: HCPCS | Performed by: INTERNAL MEDICINE

## 2025-08-12 PROCEDURE — 1126F AMNT PAIN NOTED NONE PRSNT: CPT | Performed by: INTERNAL MEDICINE

## 2025-08-12 PROCEDURE — 1123F ACP DISCUSS/DSCN MKR DOCD: CPT | Performed by: INTERNAL MEDICINE

## 2025-08-12 PROCEDURE — 1036F TOBACCO NON-USER: CPT | Performed by: INTERNAL MEDICINE

## 2025-08-12 ASSESSMENT — ENCOUNTER SYMPTOMS
ABDOMINAL PAIN: 0
SHORTNESS OF BREATH: 0

## (undated) DEVICE — CANISTER, RIGID, 2000CC: Brand: MEDLINE INDUSTRIES, INC.

## (undated) DEVICE — SUTURE VCRL SZ 4-0 L18IN ABSRB UD L19MM PS-2 3/8 CIR PRIM J496H

## (undated) DEVICE — NEEDLE HYPO 21GA L1.5IN INTRAMUSCULAR S STL LATCH BVL UP

## (undated) DEVICE — APPLIER RMFG CLP LIG MED 23.8 --

## (undated) DEVICE — PAD,NON-ADHERENT,3X8,STERILE,LF,1/PK: Brand: MEDLINE

## (undated) DEVICE — STRIP,CLOSURE,WOUND,MEDI-STRIP,1/2X4: Brand: MEDLINE

## (undated) DEVICE — SUTURE VCRL SZ 4-0 L27IN ABSRB UD L19MM PS-2 3/8 CIR PRIM J426H

## (undated) DEVICE — PREP SKN CHLRAPRP APL 26ML STR --

## (undated) DEVICE — APPLIER CLP L9.38IN M LIG TI DISP STR RNG HNDL LIGACLP

## (undated) DEVICE — MINOR SPLIT GENERAL: Brand: MEDLINE INDUSTRIES, INC.

## (undated) DEVICE — GLOVE SURG SZ 8 CRM LTX FREE POLYISOPRENE POLYMER BEAD ANTI

## (undated) DEVICE — SPONGE GZ W6XL6IN COT 6 PLY SUP FLUF EXTRA ABSRB FOR PRE OP

## (undated) DEVICE — COVER PRB L11.9CM TAPR L3.8X61CM TRNSPAR SFT PLIABLE

## (undated) DEVICE — CONTAINER,SPECIMEN,O.R.STRL,4.5OZ: Brand: MEDLINE

## (undated) DEVICE — DRAPE,T,LAPARO,TRANS,STERILE: Brand: MEDLINE

## (undated) DEVICE — ILLUMINATOR ELECTROCAUTERY RETRACTED L8IN EXT L11IN DYN 10PK

## (undated) DEVICE — Device

## (undated) DEVICE — CONTAINER COLL/TRNSPRT BX BRST -- E-Z-EM CAT 8390

## (undated) DEVICE — APPLIER LIG CLP M L11IN TI STR RNG HNDL FOR 20 CLP DISP

## (undated) DEVICE — SOL TOP ADH MASTISOL 2/3ML VI -- NDC#00496052348

## (undated) DEVICE — PENCIL ES L3M BTTN SWCH HOLSTER W/ BLDE ELECTRD EDGE

## (undated) DEVICE — SOLUTION IRRIG 1000ML 0.9% SOD CHL USP POUR PLAS BTL

## (undated) DEVICE — SUT SLK 3-0 30IN SH BLK --

## (undated) DEVICE — BUTTON SWITCH PENCIL BLADE ELECTRODE, HOLSTER: Brand: EDGE

## (undated) DEVICE — WIRE CUTTER, STERILE (WCS144): Brand: CENTURION MEDICAL PRODUCTS CORP

## (undated) DEVICE — DRAIN SURG 19FR SIL RND HUBLESS RADPQ W/O TRCR BLAK

## (undated) DEVICE — 3M™ TEGADERM™ TRANSPARENT FILM DRESSING FRAME STYLE, 1626W, 4 IN X 4-3/4 IN (10 CM X 12 CM), 50/CT 4CT/CASE: Brand: 3M™ TEGADERM™

## (undated) DEVICE — SUTURE VCRL SZ 3-0 L27IN ABSRB UD L26MM SH 1/2 CIR J416H

## (undated) DEVICE — GARMENT,MEDLINE,DVT,INT,CALF,MED, GEN2: Brand: MEDLINE

## (undated) DEVICE — SUTURE NONABSORBABLE MONOFILAMENT 3-0 PS-1 18 IN BLK ETHILON 1663H

## (undated) DEVICE — SPONGE LAPAROTOMY W18XL18IN WHITE STRUNG RADIOPAQUE STERILE